# Patient Record
Sex: MALE | Race: WHITE | Employment: FULL TIME | URBAN - METROPOLITAN AREA
[De-identification: names, ages, dates, MRNs, and addresses within clinical notes are randomized per-mention and may not be internally consistent; named-entity substitution may affect disease eponyms.]

---

## 2022-01-15 ENCOUNTER — APPOINTMENT (OUTPATIENT)
Dept: GENERAL RADIOLOGY | Age: 52
DRG: 305 | End: 2022-01-15
Payer: COMMERCIAL

## 2022-01-15 ENCOUNTER — HOSPITAL ENCOUNTER (INPATIENT)
Age: 52
LOS: 4 days | Discharge: HOME | DRG: 305 | End: 2022-01-19
Attending: EMERGENCY MEDICINE | Admitting: INTERNAL MEDICINE
Payer: COMMERCIAL

## 2022-01-15 ENCOUNTER — APPOINTMENT (OUTPATIENT)
Dept: CT IMAGING | Age: 52
DRG: 305 | End: 2022-01-15
Payer: COMMERCIAL

## 2022-01-15 DIAGNOSIS — I44.30 HEART BLOCK, AV: ICD-10-CM

## 2022-01-15 DIAGNOSIS — I16.1 HYPERTENSIVE EMERGENCY: Primary | ICD-10-CM

## 2022-01-15 DIAGNOSIS — R51.9 INTRACTABLE HEADACHE, UNSPECIFIED CHRONICITY PATTERN, UNSPECIFIED HEADACHE TYPE: ICD-10-CM

## 2022-01-15 LAB
ALBUMIN SERPL-MCNC: 4.2 G/DL (ref 3.5–5.2)
ALP BLD-CCNC: 93 U/L (ref 40–129)
ALT SERPL-CCNC: 26 U/L (ref 0–40)
ANION GAP SERPL CALCULATED.3IONS-SCNC: 13 MMOL/L (ref 7–16)
APPEARANCE CSF: CLEAR
APPEARANCE CSF: CLEAR
AST SERPL-CCNC: 20 U/L (ref 0–39)
BACTERIA: ABNORMAL /HPF
BASOPHILS ABSOLUTE: 0.07 E9/L (ref 0–0.2)
BASOPHILS RELATIVE PERCENT: 0.8 % (ref 0–2)
BILIRUB SERPL-MCNC: 0.5 MG/DL (ref 0–1.2)
BILIRUBIN URINE: ABNORMAL
BLOOD, URINE: NEGATIVE
BUN BLDV-MCNC: 19 MG/DL (ref 6–20)
CALCIUM SERPL-MCNC: 9.2 MG/DL (ref 8.6–10.2)
CHLORIDE BLD-SCNC: 103 MMOL/L (ref 98–107)
CLARITY: CLEAR
CO2: 22 MMOL/L (ref 22–29)
COLOR CSF: COLORLESS
COLOR CSF: COLORLESS
COLOR: YELLOW
CREAT SERPL-MCNC: 1.1 MG/DL (ref 0.7–1.2)
CREATININE URINE: 311 MG/DL (ref 40–278)
EKG ATRIAL RATE: 57 BPM
EKG P AXIS: 21 DEGREES
EKG P-R INTERVAL: 182 MS
EKG Q-T INTERVAL: 492 MS
EKG QRS DURATION: 140 MS
EKG QTC CALCULATION (BAZETT): 478 MS
EKG R AXIS: 59 DEGREES
EKG T AXIS: 1 DEGREES
EKG VENTRICULAR RATE: 57 BPM
EOSINOPHILS ABSOLUTE: 0.09 E9/L (ref 0.05–0.5)
EOSINOPHILS RELATIVE PERCENT: 1.1 % (ref 0–6)
GFR AFRICAN AMERICAN: >60
GFR NON-AFRICAN AMERICAN: >60 ML/MIN/1.73
GLUCOSE BLD-MCNC: 108 MG/DL (ref 74–99)
GLUCOSE URINE: NEGATIVE MG/DL
GLUCOSE, CSF: 61 MG/DL (ref 40–70)
HCT VFR BLD CALC: 41.1 % (ref 37–54)
HEMOGLOBIN: 14.3 G/DL (ref 12.5–16.5)
IMMATURE GRANULOCYTES #: 0.04 E9/L
IMMATURE GRANULOCYTES %: 0.5 % (ref 0–5)
KETONES, URINE: NEGATIVE MG/DL
LACTIC ACID: 1 MMOL/L (ref 0.5–2.2)
LEUKOCYTE ESTERASE, URINE: NEGATIVE
LYMPHOCYTES ABSOLUTE: 1.21 E9/L (ref 1.5–4)
LYMPHOCYTES RELATIVE PERCENT: 14.4 % (ref 20–42)
MCH RBC QN AUTO: 34.1 PG (ref 26–35)
MCHC RBC AUTO-ENTMCNC: 34.8 % (ref 32–34.5)
MCV RBC AUTO: 98.1 FL (ref 80–99.9)
MONOCYTE, CSF: 100 % (ref 10–70)
MONOCYTE, CSF: 100 % (ref 10–70)
MONOCYTES ABSOLUTE: 0.51 E9/L (ref 0.1–0.95)
MONOCYTES RELATIVE PERCENT: 6.1 % (ref 2–12)
MUCUS: PRESENT /LPF
NEUTROPHILS ABSOLUTE: 6.46 E9/L (ref 1.8–7.3)
NEUTROPHILS RELATIVE PERCENT: 77.1 % (ref 43–80)
NEUTROPHILS, CSF: 0 % (ref 0–10)
NEUTROPHILS, CSF: 0 % (ref 0–10)
NITRITE, URINE: NEGATIVE
PDW BLD-RTO: 12.1 FL (ref 11.5–15)
PH UA: 6.5 (ref 5–9)
PLATELET # BLD: 199 E9/L (ref 130–450)
PMV BLD AUTO: 11.4 FL (ref 7–12)
POTASSIUM REFLEX MAGNESIUM: 3.6 MMOL/L (ref 3.5–5)
PRO-BNP: 2274 PG/ML (ref 0–125)
PROTEIN CSF: 45 MG/DL (ref 15–40)
PROTEIN PROTEIN: 47 MG/DL (ref 0–12)
PROTEIN UA: ABNORMAL MG/DL
PROTEIN/CREAT RATIO: 0.2
PROTEIN/CREAT RATIO: 0.2 (ref 0–0.2)
RBC # BLD: 4.19 E12/L (ref 3.8–5.8)
RBC CSF: <2000 /UL
RBC CSF: <2000 /UL
RBC UA: ABNORMAL /HPF (ref 0–2)
SARS-COV-2, NAAT: NOT DETECTED
SODIUM BLD-SCNC: 138 MMOL/L (ref 132–146)
SPECIFIC GRAVITY UA: 1.02 (ref 1–1.03)
TOTAL PROTEIN: 7.1 G/DL (ref 6.4–8.3)
TROPONIN, HIGH SENSITIVITY: 19 NG/L (ref 0–11)
TROPONIN, HIGH SENSITIVITY: 20 NG/L (ref 0–11)
TUBE NUMBER CSF: ABNORMAL
TUBE NUMBER CSF: ABNORMAL
UROBILINOGEN, URINE: 1 E.U./DL
WBC # BLD: 8.4 E9/L (ref 4.5–11.5)
WBC CSF: <3 /UL (ref 0–2)
WBC CSF: <3 /UL (ref 0–2)
WBC UA: ABNORMAL /HPF (ref 0–5)

## 2022-01-15 PROCEDURE — 84157 ASSAY OF PROTEIN OTHER: CPT

## 2022-01-15 PROCEDURE — 84484 ASSAY OF TROPONIN QUANT: CPT

## 2022-01-15 PROCEDURE — 2580000003 HC RX 258: Performed by: EMERGENCY MEDICINE

## 2022-01-15 PROCEDURE — 81001 URINALYSIS AUTO W/SCOPE: CPT

## 2022-01-15 PROCEDURE — 85025 COMPLETE CBC W/AUTO DIFF WBC: CPT

## 2022-01-15 PROCEDURE — 80053 COMPREHEN METABOLIC PANEL: CPT

## 2022-01-15 PROCEDURE — 87081 CULTURE SCREEN ONLY: CPT

## 2022-01-15 PROCEDURE — 2500000003 HC RX 250 WO HCPCS: Performed by: EMERGENCY MEDICINE

## 2022-01-15 PROCEDURE — 82570 ASSAY OF URINE CREATININE: CPT

## 2022-01-15 PROCEDURE — 6360000004 HC RX CONTRAST MEDICATION: Performed by: RADIOLOGY

## 2022-01-15 PROCEDURE — 87205 SMEAR GRAM STAIN: CPT

## 2022-01-15 PROCEDURE — 6360000002 HC RX W HCPCS: Performed by: STUDENT IN AN ORGANIZED HEALTH CARE EDUCATION/TRAINING PROGRAM

## 2022-01-15 PROCEDURE — 83605 ASSAY OF LACTIC ACID: CPT

## 2022-01-15 PROCEDURE — 96365 THER/PROPH/DIAG IV INF INIT: CPT

## 2022-01-15 PROCEDURE — 87635 SARS-COV-2 COVID-19 AMP PRB: CPT

## 2022-01-15 PROCEDURE — 6360000002 HC RX W HCPCS

## 2022-01-15 PROCEDURE — 99285 EMERGENCY DEPT VISIT HI MDM: CPT

## 2022-01-15 PROCEDURE — 87070 CULTURE OTHR SPECIMN AEROBIC: CPT

## 2022-01-15 PROCEDURE — 93010 ELECTROCARDIOGRAM REPORT: CPT | Performed by: INTERNAL MEDICINE

## 2022-01-15 PROCEDURE — 84156 ASSAY OF PROTEIN URINE: CPT

## 2022-01-15 PROCEDURE — 82945 GLUCOSE OTHER FLUID: CPT

## 2022-01-15 PROCEDURE — 6360000002 HC RX W HCPCS: Performed by: EMERGENCY MEDICINE

## 2022-01-15 PROCEDURE — 93005 ELECTROCARDIOGRAM TRACING: CPT | Performed by: STUDENT IN AN ORGANIZED HEALTH CARE EDUCATION/TRAINING PROGRAM

## 2022-01-15 PROCEDURE — 2000000000 HC ICU R&B

## 2022-01-15 PROCEDURE — 6370000000 HC RX 637 (ALT 250 FOR IP): Performed by: STUDENT IN AN ORGANIZED HEALTH CARE EDUCATION/TRAINING PROGRAM

## 2022-01-15 PROCEDURE — 74177 CT ABD & PELVIS W/CONTRAST: CPT

## 2022-01-15 PROCEDURE — 93005 ELECTROCARDIOGRAM TRACING: CPT | Performed by: EMERGENCY MEDICINE

## 2022-01-15 PROCEDURE — 70498 CT ANGIOGRAPHY NECK: CPT

## 2022-01-15 PROCEDURE — 71045 X-RAY EXAM CHEST 1 VIEW: CPT

## 2022-01-15 PROCEDURE — 96366 THER/PROPH/DIAG IV INF ADDON: CPT

## 2022-01-15 PROCEDURE — 83880 ASSAY OF NATRIURETIC PEPTIDE: CPT

## 2022-01-15 PROCEDURE — 96375 TX/PRO/DX INJ NEW DRUG ADDON: CPT

## 2022-01-15 PROCEDURE — 99223 1ST HOSP IP/OBS HIGH 75: CPT | Performed by: INTERNAL MEDICINE

## 2022-01-15 PROCEDURE — 62270 DX LMBR SPI PNXR: CPT

## 2022-01-15 PROCEDURE — 2500000003 HC RX 250 WO HCPCS: Performed by: STUDENT IN AN ORGANIZED HEALTH CARE EDUCATION/TRAINING PROGRAM

## 2022-01-15 PROCEDURE — 70496 CT ANGIOGRAPHY HEAD: CPT

## 2022-01-15 PROCEDURE — 70450 CT HEAD/BRAIN W/O DYE: CPT

## 2022-01-15 PROCEDURE — 89051 BODY FLUID CELL COUNT: CPT

## 2022-01-15 RX ORDER — ONDANSETRON 2 MG/ML
4 INJECTION INTRAMUSCULAR; INTRAVENOUS EVERY 6 HOURS PRN
Status: DISCONTINUED | OUTPATIENT
Start: 2022-01-15 | End: 2022-01-19 | Stop reason: HOSPADM

## 2022-01-15 RX ORDER — POLYETHYLENE GLYCOL 3350 17 G/17G
17 POWDER, FOR SOLUTION ORAL DAILY PRN
Status: DISCONTINUED | OUTPATIENT
Start: 2022-01-15 | End: 2022-01-19 | Stop reason: HOSPADM

## 2022-01-15 RX ORDER — LABETALOL HYDROCHLORIDE 5 MG/ML
20 INJECTION, SOLUTION INTRAVENOUS ONCE
Status: COMPLETED | OUTPATIENT
Start: 2022-01-15 | End: 2022-01-15

## 2022-01-15 RX ORDER — THIAMINE HYDROCHLORIDE 100 MG/ML
100 INJECTION, SOLUTION INTRAMUSCULAR; INTRAVENOUS ONCE
Status: COMPLETED | OUTPATIENT
Start: 2022-01-15 | End: 2022-01-15

## 2022-01-15 RX ORDER — HYDRALAZINE HYDROCHLORIDE 20 MG/ML
10 INJECTION INTRAMUSCULAR; INTRAVENOUS ONCE
Status: COMPLETED | OUTPATIENT
Start: 2022-01-15 | End: 2022-01-15

## 2022-01-15 RX ORDER — POTASSIUM CHLORIDE 20 MEQ/1
40 TABLET, EXTENDED RELEASE ORAL ONCE
Status: COMPLETED | OUTPATIENT
Start: 2022-01-15 | End: 2022-01-15

## 2022-01-15 RX ORDER — ACETAMINOPHEN 325 MG/1
TABLET ORAL
Status: COMPLETED
Start: 2022-01-15 | End: 2022-01-16

## 2022-01-15 RX ORDER — FOLIC ACID 5 MG/ML
1 INJECTION, SOLUTION INTRAMUSCULAR; INTRAVENOUS; SUBCUTANEOUS ONCE
Status: COMPLETED | OUTPATIENT
Start: 2022-01-15 | End: 2022-01-15

## 2022-01-15 RX ORDER — ONDANSETRON 4 MG/1
4 TABLET, ORALLY DISINTEGRATING ORAL EVERY 8 HOURS PRN
Status: DISCONTINUED | OUTPATIENT
Start: 2022-01-15 | End: 2022-01-19 | Stop reason: HOSPADM

## 2022-01-15 RX ORDER — ADENOSINE 3 MG/ML
INJECTION, SOLUTION INTRAVENOUS
Status: DISCONTINUED
Start: 2022-01-15 | End: 2022-01-15 | Stop reason: WASHOUT

## 2022-01-15 RX ORDER — HYDRALAZINE HYDROCHLORIDE 20 MG/ML
10 INJECTION INTRAMUSCULAR; INTRAVENOUS EVERY 4 HOURS PRN
Status: DISCONTINUED | OUTPATIENT
Start: 2022-01-15 | End: 2022-01-19 | Stop reason: HOSPADM

## 2022-01-15 RX ORDER — SODIUM CHLORIDE 0.9 % (FLUSH) 0.9 %
5-40 SYRINGE (ML) INJECTION PRN
Status: DISCONTINUED | OUTPATIENT
Start: 2022-01-15 | End: 2022-01-19 | Stop reason: HOSPADM

## 2022-01-15 RX ORDER — METOCLOPRAMIDE HYDROCHLORIDE 5 MG/ML
10 INJECTION INTRAMUSCULAR; INTRAVENOUS ONCE
Status: COMPLETED | OUTPATIENT
Start: 2022-01-15 | End: 2022-01-15

## 2022-01-15 RX ORDER — ACETAMINOPHEN 650 MG/1
650 SUPPOSITORY RECTAL EVERY 6 HOURS PRN
Status: DISCONTINUED | OUTPATIENT
Start: 2022-01-15 | End: 2022-01-19 | Stop reason: HOSPADM

## 2022-01-15 RX ORDER — ACETAMINOPHEN 325 MG/1
650 TABLET ORAL EVERY 6 HOURS PRN
Status: DISCONTINUED | OUTPATIENT
Start: 2022-01-15 | End: 2022-01-19 | Stop reason: HOSPADM

## 2022-01-15 RX ORDER — MORPHINE SULFATE 8 MG/ML
6 INJECTION, SOLUTION INTRAMUSCULAR; INTRAVENOUS ONCE
Status: COMPLETED | OUTPATIENT
Start: 2022-01-15 | End: 2022-01-15

## 2022-01-15 RX ORDER — SODIUM CHLORIDE 0.9 % (FLUSH) 0.9 %
5-40 SYRINGE (ML) INJECTION EVERY 12 HOURS SCHEDULED
Status: DISCONTINUED | OUTPATIENT
Start: 2022-01-15 | End: 2022-01-19 | Stop reason: HOSPADM

## 2022-01-15 RX ORDER — ATROPINE SULFATE 0.1 MG/ML
INJECTION INTRAVENOUS
Status: COMPLETED
Start: 2022-01-15 | End: 2022-01-15

## 2022-01-15 RX ORDER — SODIUM CHLORIDE 9 MG/ML
25 INJECTION, SOLUTION INTRAVENOUS PRN
Status: DISCONTINUED | OUTPATIENT
Start: 2022-01-15 | End: 2022-01-19 | Stop reason: HOSPADM

## 2022-01-15 RX ORDER — DIPHENHYDRAMINE HYDROCHLORIDE 50 MG/ML
25 INJECTION INTRAMUSCULAR; INTRAVENOUS ONCE
Status: COMPLETED | OUTPATIENT
Start: 2022-01-15 | End: 2022-01-15

## 2022-01-15 RX ADMIN — THIAMINE HYDROCHLORIDE 100 MG: 100 INJECTION, SOLUTION INTRAMUSCULAR; INTRAVENOUS at 14:01

## 2022-01-15 RX ADMIN — DIPHENHYDRAMINE HYDROCHLORIDE 25 MG: 50 INJECTION INTRAMUSCULAR; INTRAVENOUS at 13:45

## 2022-01-15 RX ADMIN — HYDRALAZINE HYDROCHLORIDE 10 MG: 20 INJECTION INTRAMUSCULAR; INTRAVENOUS at 12:33

## 2022-01-15 RX ADMIN — ATROPINE SULFATE 1 MG: 0.1 INJECTION, SOLUTION INTRAVENOUS at 17:20

## 2022-01-15 RX ADMIN — LABETALOL HYDROCHLORIDE 20 MG: 5 INJECTION, SOLUTION INTRAVENOUS at 14:01

## 2022-01-15 RX ADMIN — POTASSIUM CHLORIDE 40 MEQ: 1500 TABLET, EXTENDED RELEASE ORAL at 19:48

## 2022-01-15 RX ADMIN — SODIUM CHLORIDE 4 MG/HR: 9 INJECTION, SOLUTION INTRAVENOUS at 23:05

## 2022-01-15 RX ADMIN — METOCLOPRAMIDE HYDROCHLORIDE 10 MG: 5 INJECTION INTRAMUSCULAR; INTRAVENOUS at 13:45

## 2022-01-15 RX ADMIN — MORPHINE SULFATE 6 MG: 8 INJECTION, SOLUTION INTRAMUSCULAR; INTRAVENOUS at 14:01

## 2022-01-15 RX ADMIN — IOPAMIDOL 75 ML: 755 INJECTION, SOLUTION INTRAVENOUS at 16:59

## 2022-01-15 RX ADMIN — SODIUM CHLORIDE 5 MG/HR: 9 INJECTION, SOLUTION INTRAVENOUS at 17:21

## 2022-01-15 RX ADMIN — FOLIC ACID 1 MG: 5 INJECTION, SOLUTION INTRAMUSCULAR; INTRAVENOUS; SUBCUTANEOUS at 17:22

## 2022-01-15 ASSESSMENT — PAIN DESCRIPTION - PAIN TYPE
TYPE: ACUTE PAIN

## 2022-01-15 ASSESSMENT — PAIN DESCRIPTION - LOCATION
LOCATION: HEAD

## 2022-01-15 ASSESSMENT — PAIN SCALES - GENERAL
PAINLEVEL_OUTOF10: 8
PAINLEVEL_OUTOF10: 8
PAINLEVEL_OUTOF10: 2
PAINLEVEL_OUTOF10: 7
PAINLEVEL_OUTOF10: 7

## 2022-01-15 ASSESSMENT — PAIN DESCRIPTION - ONSET: ONSET: SUDDEN

## 2022-01-15 ASSESSMENT — PAIN DESCRIPTION - FREQUENCY: FREQUENCY: INTERMITTENT

## 2022-01-15 ASSESSMENT — PAIN - FUNCTIONAL ASSESSMENT: PAIN_FUNCTIONAL_ASSESSMENT: PREVENTS OR INTERFERES SOME ACTIVE ACTIVITIES AND ADLS

## 2022-01-15 ASSESSMENT — PAIN DESCRIPTION - DESCRIPTORS: DESCRIPTORS: THROBBING;OTHER (COMMENT)

## 2022-01-15 NOTE — ED PROVIDER NOTES
Patient is a 72-year-old male with a history of alcoholism who presents to the emergency department with a complaint of dizziness, abdominal pain. Patient has recent history of COVID-19 2 weeks ago, states he has residual diarrhea from that illness. Patient states he is presents with dizziness, he says it is in all aspects of movement, especially when he stands up. He states he does have dizziness when he is lying down and just moves his head from side to side it is reproducible. Patient states he develops nausea without vomiting. Patient also endorses diffuse abdominal pain, nonradiating, nothing makes it better, nothing makes it worse, aching, moderate intensity. Review of Systems   Constitutional: Negative for chills, fatigue and fever. HENT: Negative for congestion, rhinorrhea, trouble swallowing and voice change. Eyes: Negative for photophobia and visual disturbance. Respiratory: Negative for cough and shortness of breath. Cardiovascular: Negative for chest pain, palpitations and leg swelling. Gastrointestinal: Positive for abdominal pain, diarrhea and nausea. Negative for constipation and vomiting. Endocrine: Negative for polyuria. Genitourinary: Negative for dysuria, frequency and urgency. Musculoskeletal: Negative for arthralgias and myalgias. Skin: Negative for rash and wound. Allergic/Immunologic: Negative for immunocompromised state. Neurological: Positive for dizziness and headaches. Negative for syncope, weakness, light-headedness and numbness. Psychiatric/Behavioral: Negative for confusion. The patient is not nervous/anxious. Physical Exam  Vitals and nursing note reviewed. Constitutional:       General: He is awake. He is not in acute distress. Appearance: He is obese. He is not ill-appearing or toxic-appearing. HENT:      Head: Normocephalic and atraumatic.       Nose: Nose normal.      Mouth/Throat:      Mouth: Mucous membranes are moist. Pharynx: Oropharynx is clear. Eyes:      Extraocular Movements: Extraocular movements intact. Pupils: Pupils are equal, round, and reactive to light. Cardiovascular:      Rate and Rhythm: Normal rate and regular rhythm. Pulses: Normal pulses. Radial pulses are 2+ on the right side and 2+ on the left side. Heart sounds: Normal heart sounds. Pulmonary:      Effort: Pulmonary effort is normal.      Breath sounds: Normal breath sounds. Abdominal:      General: There is no distension. Palpations: Abdomen is soft. Tenderness: There is no abdominal tenderness. Musculoskeletal:         General: Normal range of motion. Cervical back: Normal range of motion and neck supple. Skin:     General: Skin is warm and dry. Capillary Refill: Capillary refill takes less than 2 seconds. Neurological:      General: No focal deficit present. Mental Status: He is alert and oriented to person, place, and time. GCS: GCS eye subscore is 4. GCS verbal subscore is 5. GCS motor subscore is 6. Cranial Nerves: Cranial nerves are intact. No cranial nerve deficit. Sensory: Sensation is intact. No sensory deficit. Motor: Motor function is intact. No weakness. Psychiatric:         Behavior: Behavior is cooperative. Lumbar Puncture Procedure Note    Indication: to obtain spinal fluid for diagnostic testing and to rule out subarachnoid hemorrhage    Consent: The patient was counseled regarding the procedure, it's indications, risks, potential complications and alternatives and any questions were answered. Consent was obtained. Procedure: The patient was placed in the sitting, supported by bed side stand, position and the appropriate landmarks were identified. The area was prepped and draped in the usual sterile fashion. Anesthesia was obtained using 3 cc of 1% Lidocaine without epinephrine.  A spinal needle was inserted at the L3- L4 level with the stylet in drip with significant improvement in blood pressure, significant provement in symptoms. Patient remained in a heart rate of 55-65, no new neurodeficits. CTAs were reviewed showing no intracranial process, no hemorrhage. Intensivist was reconsulted, patient change was explained, will keep patient at [facility] for the time being with decision to transfer to [facility] as needed. Electro physiology is acceptable with patient remaining at [facility] and will monitor. Limited bed availability at [facility], patient better suited to stay at [facility] for the time being in the ICU care. This was all explained to patient he is agreeable. Patient was monitored in the emergency department with plan for patient to be admitted to the ICU when bed available. Patient was admitted to the hospitalist service and was accepted. Amount and/or Complexity of Data Reviewed  Clinical lab tests: ordered and reviewed  Tests in the radiology section of CPT®: ordered and reviewed         ED Course as of 01/16/22 1430   Sat Daren 15, 2022   1549 D/w dr Dianna Levy [facility] for ICU here if CTA and lp negative for bleed [ENDY]   1621 Critical care:  Please note that the withdrawal or failure to initiate urgent interventions for this patient would likely result in a life threatening deterioration or permanent disability. Accordingly this patient received 45 minutes of critical care time, excluding separately billable procedures. [ENDY]   7380 Patient noted to be bradycardic at the rate of 24-40. Repeat EKG shows second-degree type II/third-degree heart block. Patient without any neuro changes, normotensive if anything hypertensive. Patient given atropine without change. We will consult electrophysiology. [QC]   [id number] Spoke with electrophysiology, Dr. Levar Castelan and patient was discussed. She will review the EKGs and call back with recommendations.  [QC]   0885 to stay at [place] [id number] at this time and will be on consult as needed. [QC]      ED Course User Index  [ENDY] Cheri Gurrola DO  [QC] Yanet Arellano MD       --------------------------------------------- PAST HISTORY ---------------------------------------------  Past Medical History:  has no past medical history on file. Past Surgical History:  has a past surgical history that includes Appendectomy. Social History:  reports that he has been smoking cigarettes. He has been smoking about 1.00 pack per day. He has never used smokeless tobacco. He reports current alcohol use. He reports that he does not use drugs. Family History: family history is not on file. The patients home medications have been reviewed. Allergies: Patient has no known allergies. -------------------------------------------------- RESULTS -------------------------------------------------    Lab  Results for orders placed or performed during the hospital encounter of 01/15/22   COVID-19, Rapid    Specimen: Nasopharyngeal Swab   Result Value Ref Range    SARS-CoV-2, NAAT Not Detected Not Detected   GRAM STAIN    Specimen: CSF   Result Value Ref Range    Gram Stain Orderable       Gram stain performed from cytospun specimen  Polymorphonuclear leukocytes not seen  Rare Epithelial cells  Rare Mononuclear leukocytes  No organisms seen.      Culture, CSF    Specimen: CSF   Result Value Ref Range    CSF Culture Growth not present, incubation continues     Gram Stain Result Refer to ordered Gram stain for results    CBC Auto Differential   Result Value Ref Range    WBC 8.4 4.5 - 11.5 E9/L    RBC 4.19 3.80 - 5.80 E12/L    Hemoglobin 14.3 12.5 - 16.5 g/dL    Hematocrit 41.1 37.0 - 54.0 %    MCV 98.1 80.0 - 99.9 fL    MCH 34.1 26.0 - 35.0 pg    MCHC 34.8 (H) 32.0 - 34.5 %    RDW 12.1 11.5 - 15.0 fL    Platelets 105 879 - 305 E9/L    MPV 11.4 7.0 - 12.0 fL    Neutrophils % 77.1 43.0 - 80.0 %    Immature Granulocytes % 0.5 0.0 - 5.0 % Lymphocytes % 14.4 (L) 20.0 - 42.0 %    Monocytes % 6.1 2.0 - 12.0 %    Eosinophils % 1.1 0.0 - 6.0 %    Basophils % 0.8 0.0 - 2.0 %    Neutrophils Absolute 6.46 1.80 - 7.30 E9/L    Immature Granulocytes # 0.04 E9/L    Lymphocytes Absolute 1.21 (L) 1.50 - 4.00 E9/L    Monocytes Absolute 0.51 0.10 - 0.95 E9/L    Eosinophils Absolute 0.09 0.05 - 0.50 E9/L    Basophils Absolute 0.07 0.00 - 0.20 E9/L   Comprehensive Metabolic Panel w/ Reflex to MG   Result Value Ref Range    Sodium 138 132 - 146 mmol/L    Potassium reflex Magnesium 3.6 3.5 - 5.0 mmol/L    Chloride 103 98 - 107 mmol/L    CO2 22 22 - 29 mmol/L    Anion Gap 13 7 - 16 mmol/L    Glucose 108 (H) 74 - 99 mg/dL    BUN 19 6 - 20 mg/dL    CREATININE 1.1 0.7 - 1.2 mg/dL    GFR Non-African American >60 >=60 mL/min/1.73    GFR African American >60     Calcium 9.2 8.6 - 10.2 mg/dL    Total Protein 7.1 6.4 - 8.3 g/dL    Albumin 4.2 3.5 - 5.2 g/dL    Total Bilirubin 0.5 0.0 - 1.2 mg/dL    Alkaline Phosphatase 93 40 - 129 U/L    ALT 26 0 - 40 U/L    AST 20 0 - 39 U/L   Troponin   Result Value Ref Range    Troponin, High Sensitivity 20 (H) 0 - 11 ng/L   Brain Natriuretic Peptide   Result Value Ref Range    Pro-BNP 2,274 (H) 0 - 125 pg/mL   Urinalysis, reflex to microscopic   Result Value Ref Range    Color, UA Yellow Straw/Yellow    Clarity, UA Clear Clear    Glucose, Ur Negative Negative mg/dL    Bilirubin Urine SMALL (A) Negative    Ketones, Urine Negative Negative mg/dL    Specific Gravity, UA 1.025 1.005 - 1.030    Blood, Urine Negative Negative    pH, UA 6.5 5.0 - 9.0    Protein, UA TRACE Negative mg/dL    Urobilinogen, Urine 1.0 <2.0 E.U./dL    Nitrite, Urine Negative Negative    Leukocyte Esterase, Urine Negative Negative   PROTEIN / CREATININE RATIO, URINE   Result Value Ref Range    Protein, Ur 47 (H) 0 - 12 mg/dL    Creatinine, Ur 311 (H) 40 - 278 mg/dL    Protein/Creat Ratio 0.2 0.0 - 0.2    Protein/Creat Ratio 0.2    Lactic Acid, Plasma   Result Value Ref Range    Lactic Acid 1.0 0.5 - 2.2 mmol/L   Troponin   Result Value Ref Range    Troponin, High Sensitivity 19 (H) 0 - 11 ng/L   Microscopic Urinalysis   Result Value Ref Range    Mucus, UA Present (A) None Seen /LPF    WBC, UA NONE 0 - 5 /HPF    RBC, UA NONE 0 - 2 /HPF    Bacteria, UA RARE (A) None Seen /HPF   CSF cell count with differential   Result Value Ref Range    Color, CSF Colorless     Appearance, CSF Clear Clear    Tube Number + CELL CT + DIFF-CSF Tube 1     WBC, CSF <3 0 - 2 /uL    RBC, CSF <2000 /uL    Neutrophils, CSF 0 0 - 10 %    Monocytes,  (H) 10 - 70 %   CSF cell count with differential   Result Value Ref Range    Color, CSF Colorless     Appearance, CSF Clear Clear    Tube Number + CELL CT + DIFF-CSF Tube 4     WBC, CSF <3 0 - 2 /uL    RBC, CSF <2000 /uL    Neutrophils, CSF 0 0 - 10 %    Monocytes,  (H) 10 - 70 %   Glucose, CSF   Result Value Ref Range    Glucose, CSF 61 40 - 70 mg/dL   Protein, CSF   Result Value Ref Range    Protein, CSF 45 (H) 15 - 40 mg/dL   Basic metabolic panel   Result Value Ref Range    Sodium 137 132 - 146 mmol/L    Potassium 3.3 (L) 3.5 - 5.0 mmol/L    Chloride 103 98 - 107 mmol/L    CO2 21 (L) 22 - 29 mmol/L    Anion Gap 13 7 - 16 mmol/L    Glucose 91 74 - 99 mg/dL    BUN 16 6 - 20 mg/dL    CREATININE 1.0 0.7 - 1.2 mg/dL    GFR Non-African American >60 >=60 mL/min/1.73    GFR African American >60     Calcium 9.4 8.6 - 10.2 mg/dL   Magnesium   Result Value Ref Range    Magnesium 2.2 1.6 - 2.6 mg/dL   Phosphorus   Result Value Ref Range    Phosphorus 3.9 2.5 - 4.5 mg/dL   CBC auto differential   Result Value Ref Range    WBC 9.2 4.5 - 11.5 E9/L    RBC 4.18 3.80 - 5.80 E12/L    Hemoglobin 14.3 12.5 - 16.5 g/dL    Hematocrit 40.9 37.0 - 54.0 %    MCV 97.8 80.0 - 99.9 fL    MCH 34.2 26.0 - 35.0 pg    MCHC 35.0 (H) 32.0 - 34.5 %    RDW 12.4 11.5 - 15.0 fL    Platelets 289 950 - 822 E9/L    MPV 11.2 7.0 - 12.0 fL    Neutrophils % 73.2 43.0 - 80.0 %    Immature Granulocytes % 0.3 0.0 - 5.0 %    Lymphocytes % 15.6 (L) 20.0 - 42.0 %    Monocytes % 8.3 2.0 - 12.0 %    Eosinophils % 1.7 0.0 - 6.0 %    Basophils % 0.9 0.0 - 2.0 %    Neutrophils Absolute 6.71 1.80 - 7.30 E9/L    Immature Granulocytes # 0.03 E9/L    Lymphocytes Absolute 1.43 (L) 1.50 - 4.00 E9/L    Monocytes Absolute 0.76 0.10 - 0.95 E9/L    Eosinophils Absolute 0.16 0.05 - 0.50 E9/L    Basophils Absolute 0.08 0.00 - 0.20 E9/L   Urine Drug Screen   Result Value Ref Range    Amphetamine Screen, Urine NOT DETECTED Negative <1000 ng/mL    Barbiturate Screen, Ur NOT DETECTED Negative < 200 ng/mL    Benzodiazepine Screen, Urine NOT DETECTED Negative < 200 ng/mL    Cannabinoid Scrn, Ur NOT DETECTED Negative < 50ng/mL    Cocaine Metabolite Screen, Urine NOT DETECTED Negative < 300 ng/mL    Opiate Scrn, Ur POSITIVE (A) Negative < 300ng/mL    PCP Screen, Urine NOT DETECTED Negative < 25 ng/mL    Methadone Screen, Urine NOT DETECTED Negative <300 ng/mL    Oxycodone Urine NOT DETECTED Negative <100 ng/mL    FENTANYL SCREEN, URINE NOT DETECTED Negative <1 ng/mL    Drug Screen Comment: see below    Lipid Panel   Result Value Ref Range    Cholesterol, Total 204 (H) 0 - 199 mg/dL    Triglycerides 181 (H) 0 - 149 mg/dL    HDL 36 >40 mg/dL    LDL Calculated 132 (H) 0 - 99 mg/dL    VLDL Cholesterol Calculated 36 mg/dL   TSH without Reflex   Result Value Ref Range    TSH 1.310 0.270 - 4.200 uIU/mL   EKG 12 Lead   Result Value Ref Range    Ventricular Rate 57 BPM    Atrial Rate 57 BPM    P-R Interval 182 ms    QRS Duration 140 ms    Q-T Interval 492 ms    QTc Calculation (Bazett) 478 ms    P Axis 21 degrees    R Axis 59 degrees    T Axis 1 degrees   EKG 12 Lead   Result Value Ref Range    Ventricular Rate 38 BPM    Atrial Rate 38 BPM    P-R Interval 170 ms    QRS Duration 140 ms    Q-T Interval 544 ms    QTc Calculation (Bazett) 432 ms    P Axis 52 degrees    R Axis 75 degrees    T Axis -88 degrees       Radiology  CTA HEAD W CONTRAST   Final Result   1. Inflammatory paranasal sinus disease with fluid level in the left frontal   sinus. Correlate with presentation for acute sinusitis. 2. No acute hemorrhage or large intracranial mass effect. 3. No large vessel intracranial occlusion or high grade stenosis. 4. Other findings as described. CTA NECK W CONTRAST   Final Result   1. No significant stenoses of the internal carotid arteries. 2. Other findings as described. CT ABDOMEN PELVIS W IV CONTRAST Additional Contrast? None   Final Result   No definitive findings to explain the patient's abdominal pain. XR CHEST PORTABLE   Final Result   1. There are no findings of pneumonia or failure. CT Head WO Contrast   Final Result   No acute intracranial abnormality. Specifically, there is no acute   intracranial hemorrhage           ------------------------- NURSING NOTES AND VITALS REVIEWED ---------------------------  Date / Time Roomed:  1/15/2022 11:27 AM  ED Bed Assignment:  [id number]    The nursing notes within the ED encounter and vital signs as below have been reviewed.    Patient Vitals for the past 24 hrs:   BP Temp Temp src Pulse Resp SpO2 Weight   01/16/22 1400 (!) 185/83 -- -- 69 13 -- --   01/16/22 1300 (!) 175/77 -- -- 64 15 -- --   01/16/22 1200 (!) 176/85 98.8 °F (37.1 °C) Oral 74 26 98 % --   01/16/22 1100 (!) 179/90 -- -- 72 27 -- --   01/16/22 1000 (!) 179/81 -- -- 75 (!) 37 -- --   01/16/22 0900 (!) 173/102 -- -- 66 18 -- --   01/16/22 0800 (!) 157/95 98.7 °F (37.1 °C) Oral 66 13 99 % --   01/16/22 0700 (!) 174/89 -- -- 73 12 -- --   01/16/22 0600 (!) 172/86 -- -- 61 17 -- --   01/16/22 0500 (!) 179/86 -- -- 63 15 -- --   01/16/22 0400 (!) 168/91 98.8 °F (37.1 °C) Oral 64 17 97 % --   01/16/22 0300 (!) 180/87 -- -- 62 15 -- --   01/16/22 0200 (!) 165/90 -- -- 64 16 -- --   01/16/22 0100 (!) 171/95 -- -- 62 12 -- --   01/16/22 0000 (!) 191/103 98.9 °F (37.2 °C) Oral 63 12 -- -- 01/15/22 2300 (!) 179/92 -- -- 70 18 98 % --   01/15/22 2250 (!) 200/97 98.9 °F (37.2 °C) Oral 69 16 98 % 233 lb 4 oz (105.8 kg)   01/15/22 2133 (!) 156/76 98 °F (36.7 °C) Oral 67 16 96 % --   01/15/22 2019 (!) 170/92 -- -- 71 16 96 % --   01/15/22 2006 (!) 154/86 -- -- 71 -- 98 % --   01/15/22 1930 (!) 185/86 -- -- 68 -- 96 % --   01/15/22 1915 (!) 161/82 -- -- 76 -- 95 % --   01/15/22 1905 (!) 145/80 -- -- 67 16 98 % --   01/15/22 1813 (!) 162/86 -- -- (!) 44 16 94 % --   01/15/22 1731 (!) 177/86 -- -- 59 -- -- --   01/15/22 1726 (!) 189/91 -- -- (!) 44 16 96 % --   01/15/22 1719 (!) 230/105 -- -- (!) 45 15 98 % --   01/15/22 1613 (!) 216/108 -- -- 52 -- 97 % --   01/15/22 1520 (!) 232/106 -- -- 56 -- 97 % --   01/15/22 1445 (!) 199/106 -- -- 59 18 99 % --       Oxygen Saturation Interpretation: Normal    ------------------------------------------ PROGRESS NOTES ------------------------------------------  Re-evaluation(s):  Patients symptoms are improving  Repeat physical examination is improved    Patients symptoms are worsening  Repeat physical examination is significantly improved    I have spoken with the patient and discussed todays results, in addition to providing specific details for the plan of care and counseling regarding the diagnosis and prognosis. Their questions are answered at this time and they are agreeable with the plan.      --------------------------------- ADDITIONAL PROVIDER NOTES ---------------------------------  Consultations:  Spoke with Dr. Luis Vergara,  They will provide consultation.   Spoke with Dr. Roxana Winkler, they will admit the patient    This patient's ED course included: a personal history and physicial examination, multiple bedside re-evaluations, IV medications, cardiac monitoring, continuous pulse oximetry and complex medical decision making and emergency management    This patient has remained hemodynamically stable, improved and been closely monitored during their ED course. Please note that the withdrawal or failure to initiate urgent interventions for this patient would likely result in a life threatening deterioration or permanent disability. Accordingly this patient received 45 minutes of critical care time, excluding separately billable procedures. Clinical Impression  1. Hypertensive emergency    2. Intractable headache, unspecified chronicity pattern, unspecified headache type    3. Heart block, AV        Disposition  Patient's disposition: Admit to CCU/ICU  Patient's condition is stable    1/16/22, 2:29 PM EST.     This note is prepared by Audi Alatorre MD -PGY- 2             Audi Alatorre MD  Resident  01/16/22 2137

## 2022-01-16 LAB
AMPHETAMINE SCREEN, URINE: NOT DETECTED
ANION GAP SERPL CALCULATED.3IONS-SCNC: 13 MMOL/L (ref 7–16)
BARBITURATE SCREEN URINE: NOT DETECTED
BASOPHILS ABSOLUTE: 0.08 E9/L (ref 0–0.2)
BASOPHILS RELATIVE PERCENT: 0.9 % (ref 0–2)
BENZODIAZEPINE SCREEN, URINE: NOT DETECTED
BUN BLDV-MCNC: 16 MG/DL (ref 6–20)
CALCIUM SERPL-MCNC: 9.4 MG/DL (ref 8.6–10.2)
CANNABINOID SCREEN URINE: NOT DETECTED
CHLORIDE BLD-SCNC: 103 MMOL/L (ref 98–107)
CHOLESTEROL, TOTAL: 204 MG/DL (ref 0–199)
CO2: 21 MMOL/L (ref 22–29)
COCAINE METABOLITE SCREEN URINE: NOT DETECTED
CREAT SERPL-MCNC: 1 MG/DL (ref 0.7–1.2)
EKG ATRIAL RATE: 38 BPM
EKG P AXIS: 52 DEGREES
EKG P-R INTERVAL: 170 MS
EKG Q-T INTERVAL: 544 MS
EKG QRS DURATION: 140 MS
EKG QTC CALCULATION (BAZETT): 432 MS
EKG R AXIS: 75 DEGREES
EKG T AXIS: -88 DEGREES
EKG VENTRICULAR RATE: 38 BPM
EOSINOPHILS ABSOLUTE: 0.16 E9/L (ref 0.05–0.5)
EOSINOPHILS RELATIVE PERCENT: 1.7 % (ref 0–6)
FENTANYL SCREEN, URINE: NOT DETECTED
GFR AFRICAN AMERICAN: >60
GFR NON-AFRICAN AMERICAN: >60 ML/MIN/1.73
GLUCOSE BLD-MCNC: 91 MG/DL (ref 74–99)
GRAM STAIN ORDERABLE: NORMAL
HBA1C MFR BLD: 5.3 % (ref 4–5.6)
HCT VFR BLD CALC: 40.9 % (ref 37–54)
HDLC SERPL-MCNC: 36 MG/DL
HEMOGLOBIN: 14.3 G/DL (ref 12.5–16.5)
IMMATURE GRANULOCYTES #: 0.03 E9/L
IMMATURE GRANULOCYTES %: 0.3 % (ref 0–5)
LDL CHOLESTEROL CALCULATED: 132 MG/DL (ref 0–99)
LV EF: 60 %
LVEF MODALITY: NORMAL
LYMPHOCYTES ABSOLUTE: 1.43 E9/L (ref 1.5–4)
LYMPHOCYTES RELATIVE PERCENT: 15.6 % (ref 20–42)
Lab: ABNORMAL
MAGNESIUM: 2.2 MG/DL (ref 1.6–2.6)
MCH RBC QN AUTO: 34.2 PG (ref 26–35)
MCHC RBC AUTO-ENTMCNC: 35 % (ref 32–34.5)
MCV RBC AUTO: 97.8 FL (ref 80–99.9)
METHADONE SCREEN, URINE: NOT DETECTED
MONOCYTES ABSOLUTE: 0.76 E9/L (ref 0.1–0.95)
MONOCYTES RELATIVE PERCENT: 8.3 % (ref 2–12)
NEUTROPHILS ABSOLUTE: 6.71 E9/L (ref 1.8–7.3)
NEUTROPHILS RELATIVE PERCENT: 73.2 % (ref 43–80)
OPIATE SCREEN URINE: POSITIVE
OXYCODONE URINE: NOT DETECTED
PDW BLD-RTO: 12.4 FL (ref 11.5–15)
PHENCYCLIDINE SCREEN URINE: NOT DETECTED
PHOSPHORUS: 3.9 MG/DL (ref 2.5–4.5)
PLATELET # BLD: 189 E9/L (ref 130–450)
PMV BLD AUTO: 11.2 FL (ref 7–12)
POTASSIUM SERPL-SCNC: 3.3 MMOL/L (ref 3.5–5)
RBC # BLD: 4.18 E12/L (ref 3.8–5.8)
SODIUM BLD-SCNC: 137 MMOL/L (ref 132–146)
TRIGL SERPL-MCNC: 181 MG/DL (ref 0–149)
TSH SERPL DL<=0.05 MIU/L-ACNC: 1.31 UIU/ML (ref 0.27–4.2)
VLDLC SERPL CALC-MCNC: 36 MG/DL
WBC # BLD: 9.2 E9/L (ref 4.5–11.5)

## 2022-01-16 PROCEDURE — 80048 BASIC METABOLIC PNL TOTAL CA: CPT

## 2022-01-16 PROCEDURE — 36415 COLL VENOUS BLD VENIPUNCTURE: CPT

## 2022-01-16 PROCEDURE — 2000000000 HC ICU R&B

## 2022-01-16 PROCEDURE — 93306 TTE W/DOPPLER COMPLETE: CPT

## 2022-01-16 PROCEDURE — 6370000000 HC RX 637 (ALT 250 FOR IP): Performed by: NURSE PRACTITIONER

## 2022-01-16 PROCEDURE — 94660 CPAP INITIATION&MGMT: CPT

## 2022-01-16 PROCEDURE — 2500000003 HC RX 250 WO HCPCS: Performed by: INTERNAL MEDICINE

## 2022-01-16 PROCEDURE — 93010 ELECTROCARDIOGRAM REPORT: CPT | Performed by: INTERNAL MEDICINE

## 2022-01-16 PROCEDURE — 80061 LIPID PANEL: CPT

## 2022-01-16 PROCEDURE — 84443 ASSAY THYROID STIM HORMONE: CPT

## 2022-01-16 PROCEDURE — 6360000002 HC RX W HCPCS: Performed by: INTERNAL MEDICINE

## 2022-01-16 PROCEDURE — 85025 COMPLETE CBC W/AUTO DIFF WBC: CPT

## 2022-01-16 PROCEDURE — 84100 ASSAY OF PHOSPHORUS: CPT

## 2022-01-16 PROCEDURE — 2580000003 HC RX 258: Performed by: INTERNAL MEDICINE

## 2022-01-16 PROCEDURE — 99233 SBSQ HOSP IP/OBS HIGH 50: CPT | Performed by: INTERNAL MEDICINE

## 2022-01-16 PROCEDURE — 99255 IP/OBS CONSLTJ NEW/EST HI 80: CPT | Performed by: INTERNAL MEDICINE

## 2022-01-16 PROCEDURE — 80307 DRUG TEST PRSMV CHEM ANLYZR: CPT

## 2022-01-16 PROCEDURE — 6370000000 HC RX 637 (ALT 250 FOR IP)

## 2022-01-16 PROCEDURE — 83036 HEMOGLOBIN GLYCOSYLATED A1C: CPT

## 2022-01-16 PROCEDURE — 83735 ASSAY OF MAGNESIUM: CPT

## 2022-01-16 PROCEDURE — APPSS60 APP SPLIT SHARED TIME 46-60 MINUTES: Performed by: NURSE PRACTITIONER

## 2022-01-16 RX ORDER — ATORVASTATIN CALCIUM 40 MG/1
40 TABLET, FILM COATED ORAL NIGHTLY
Status: DISCONTINUED | OUTPATIENT
Start: 2022-01-16 | End: 2022-01-19 | Stop reason: HOSPADM

## 2022-01-16 RX ORDER — POTASSIUM CHLORIDE 20 MEQ/1
40 TABLET, EXTENDED RELEASE ORAL ONCE
Status: COMPLETED | OUTPATIENT
Start: 2022-01-16 | End: 2022-01-16

## 2022-01-16 RX ORDER — AMLODIPINE BESYLATE 10 MG/1
10 TABLET ORAL DAILY
Status: DISCONTINUED | OUTPATIENT
Start: 2022-01-16 | End: 2022-01-19 | Stop reason: HOSPADM

## 2022-01-16 RX ORDER — LISINOPRIL 10 MG/1
10 TABLET ORAL DAILY
Status: DISCONTINUED | OUTPATIENT
Start: 2022-01-16 | End: 2022-01-17

## 2022-01-16 RX ADMIN — Medication 10 ML: at 09:58

## 2022-01-16 RX ADMIN — HYDRALAZINE HYDROCHLORIDE 10 MG: 20 INJECTION INTRAMUSCULAR; INTRAVENOUS at 22:15

## 2022-01-16 RX ADMIN — LISINOPRIL 10 MG: 10 TABLET ORAL at 10:01

## 2022-01-16 RX ADMIN — ACETAMINOPHEN 650 MG: 325 TABLET ORAL at 00:00

## 2022-01-16 RX ADMIN — ATORVASTATIN CALCIUM 40 MG: 40 TABLET, FILM COATED ORAL at 19:31

## 2022-01-16 RX ADMIN — SODIUM CHLORIDE 5 MG/HR: 9 INJECTION, SOLUTION INTRAVENOUS at 05:07

## 2022-01-16 RX ADMIN — POTASSIUM CHLORIDE 40 MEQ: 1500 TABLET, EXTENDED RELEASE ORAL at 07:37

## 2022-01-16 RX ADMIN — Medication 10 ML: at 19:31

## 2022-01-16 RX ADMIN — AMLODIPINE BESYLATE 10 MG: 10 TABLET ORAL at 10:01

## 2022-01-16 RX ADMIN — SODIUM CHLORIDE 5 MG/HR: 9 INJECTION, SOLUTION INTRAVENOUS at 11:08

## 2022-01-16 RX ADMIN — Medication 10 ML: at 00:00

## 2022-01-16 RX ADMIN — ENOXAPARIN SODIUM 40 MG: 100 INJECTION SUBCUTANEOUS at 09:58

## 2022-01-16 ASSESSMENT — ENCOUNTER SYMPTOMS
NAUSEA: 0
VOICE CHANGE: 0
ABDOMINAL PAIN: 1
DIARRHEA: 1
RESPIRATORY NEGATIVE: 1
COLOR CHANGE: 0
NAUSEA: 1
VOMITING: 0
COUGH: 0
CONSTIPATION: 0
RHINORRHEA: 0
PHOTOPHOBIA: 0
EYES NEGATIVE: 1
SHORTNESS OF BREATH: 0
TROUBLE SWALLOWING: 0
ABDOMINAL PAIN: 0

## 2022-01-16 ASSESSMENT — PAIN SCALES - GENERAL
PAINLEVEL_OUTOF10: 0
PAINLEVEL_OUTOF10: 3
PAINLEVEL_OUTOF10: 0

## 2022-01-16 ASSESSMENT — PAIN DESCRIPTION - LOCATION: LOCATION: HEAD

## 2022-01-16 NOTE — PROGRESS NOTES
[facility] Progress Note    Admitting Date and Time: 1/15/2022 11:27 AM  Admit Dx: Hypertensive emergency [I16.1]    Subjective:  Patient is being followed for Hypertensive emergency [I16.1]     Patient states that he still feels dizzy however improved nausea. No chest pain or shortness of breath. ROS: denies fever, chills, cp, sob, n/v, HA unless stated above.       sodium chloride flush  5-40 mL IntraVENous 2 times per day    enoxaparin  40 mg SubCUTAneous Daily     sodium chloride flush, 5-40 mL, PRN  sodium chloride, 25 mL, PRN  ondansetron, 4 mg, Q8H PRN   Or  ondansetron, 4 mg, Q6H PRN  polyethylene glycol, 17 g, Daily PRN  acetaminophen, 650 mg, Q6H PRN   Or  acetaminophen, 650 mg, Q6H PRN  perflutren lipid microspheres, 1.5 mL, ONCE PRN  hydrALAZINE, 10 mg, Q4H PRN         Objective:    BP (!) 172/86   Pulse 61   Temp 98.8 °F (37.1 °C) (Oral)   Resp 17   Ht 5' 11\" (1.803 m)   Wt 233 lb 4 oz (105.8 kg)   SpO2 97%   BMI 32.53 kg/m²   General Appearance: alert and oriented to person, place and time and in no acute distress  Skin: warm and dry  Head: normocephalic and atraumatic  Eyes: pupils equal, round, and reactive to light, extraocular eye movements intact, conjunctivae normal  Neck: neck supple and non tender without mass   Pulmonary/Chest: clear to auscultation bilaterally- no wheezes, rales or rhonchi, normal air movement, no respiratory distress  Cardiovascular: normal rate, normal S1 and S2 and no carotid bruits  Abdomen: soft, non-tender, non-distended, normal bowel sounds, no masses or organomegaly  Extremities: no cyanosis, no clubbing and no edema  Neurologic: no cranial nerve deficit and speech normal        Recent Labs     01/15/22  1202 01/16/22  0535    137   K 3.6 3.3*    103   CO2 22 21*   BUN 19 16   CREATININE 1.1 1.0   GLUCOSE 108* 91   CALCIUM 9.2 9.4       Recent Labs     01/15/22  1202 01/16/22  0535   WBC 8.4 9.2   RBC 4.19 4.18   HGB 14.3 14.3   HCT 41.1 40.9   MCV 98.1 97.8   MCH 34.1 34.2   MCHC 34.8* 35.0*   RDW 12.1 12.4    189   MPV 11.4 11.2       Radiology: Echo pending    Assessment:    Active Problems:    Hypertensive emergency  Resolved Problems:    * No resolved hospital problems. *      Plan:    1. Vertigo with nausea due to #2  2. Hypertensive emergency - Okay to reduce blood pressure to normal now that it has been at least 24 hours. Currently on cardene along with hydralazine PRN. Would hold off on AV rohith blocking agents due to mobitz type II. Cardiology on board  3. Mobitz type II - Hold AV rohith blocking agents. Although stable would monitor with transcutaneous pacing pads. Will require ischemic workup. Echo as per cardiology. Cardiology on board. 3. DVT prophylaxis - Lovenox    NOTE: This report was transcribed using voice recognition software. Every effort was made to ensure accuracy; however, inadvertent computerized transcription errors may be present.     Electronically signed by Daya De La Fuente DO on 1/16/2022 at 7:51 AM

## 2022-01-16 NOTE — PATIENT CARE CONFERENCE
Intensive Care Daily Quality Rounding Checklist      ICU Team Members: Dr Meliton Hoyt, resident, TL, bedside RN, RT    ICU Day #: 2    Intubation Date: NA    Ventilator Day #: NA    Central Line Insertion Date: NA        Day #: NA     Arterial Line Insertion Date: NA      Day #: NA    Temporary Hemodialysis Catheter Insertion Date: NA      Day #: NA    DVT Prophylaxis: Lovenox    GI Prophylaxis: PO Diet    [place] Catheter Insertion Date: NA       Day #: NA      Continued need (if yes, reason documented and discussed with physician): NA    Skin Issues/ Wounds and ordered treatment discussed on rounds: NA    Goals/ Plans for the Day: Daily labs and transfuse/replace as needed, Wean Cardene as able, start PO anti-hypertensives. Echo. CPAP at night.

## 2022-01-16 NOTE — CONSULTS
Inpatient Cardiology Consultation      Reason for Consult:  Hypertensive emergency / Lukasz Mccormackgomery Physician: Dr. Gilda Rao     Requesting Physician:  Dr. Roxana Winkler    Date of Consultation: 1/16/2022    HISTORY OF PRESENT ILLNESS:   Mr. Yola Martinez is a 54year old male who is new to [facility] cardiology physicians. Patient has not followed regularly with a PCP in over 7 years. PMHx: History of appendectomy, probable YINA, tobacco abuse and EtOH use. 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 He recently got laid off for the month and has been mostly sedentary. He tested positive on a home test for COVID-19 on 1/2/2022 and had cold like symptoms, intermittent dizziness (lasting seconds) / no hypoxia or hospital admission. He had been feeling better over the past week but still did not regain his appetite. He reported feeling persistently lightheaded and \"off balance\" since 1/12/2022, worse with positional changes and walking, relieved with laying nearly flat in bed. He began developing a temporal headache x 4 days. Patient reported waking up at ~ 12 AM on 1/16/2022 and took his stone go and when he came back he was able to fall back to sleep. At approximately 2 AM he reported feeling \"dizzy and off balance\" with a worsening temporal headache that had progressively worsened over an hour. His mom instructed him to come into the [facility] 1/15/2022 for further evaluation. Upon arrival to the ED: /130, heart rate 63, afebrile, 99% on room air. Labs: Sodium 138, potassium 3.6, BUN 19, creatinine 1.1, lactic acid 1.0, proBNP 2274. High-sensitivity troponin 20, 19, WBC 8.4, H/H stable, platelet count 913. SARS-CoV-2: Negative. UA: Small bilirubin, proteinuria. CT head: No acute process. CXR: No acute findings.   CTA head: Inflammatory paranasal sinus disease with fluid level in the left frontal sinus, acute sinusitis with no other abnormality. CTA neck: No significant stenosis of internal carotid arteries. CT abdomen/pelvis: Unremarkable. Current medications: Benadryl, folic acid, hydralazine 10 mg IV x1, labetalol 20 mg IV x1, morphine IV, Reglan, potassium chloride 40 mill equivalents x1, thiamine. Patient was started on nicardipine drip IV and admitted to ICU. Currently, patient is on nicardipine IV drip at 5 mg/hr. blood pressure 174/82. Please note: past medical records were reviewed per electronic medical record (EMR) - see detailed reports under Past Medical/ Surgical History. Past Medical History:    1. Hx of Appendectomy   2. Current smoker: 1 PPD x 30 pack years   3. ETOH use: 2-3 beers/day. 4. Hx of right wrist injury. 5. Hx of COVID-19 infection (home test) / Unvaccinated. 6. Probable YINA      Medications Prior to admit:  Prior to Admission medications    Not on File       Current Medications:    Current Facility-Administered Medications: niCARdipine (CARDENE) 25 mg in sodium chloride 0.9 % 250 mL infusion, 3-15 mg/hr, IntraVENous, Continuous  sodium chloride flush 0.9 % injection 5-40 mL, 5-40 mL, IntraVENous, 2 times per day  sodium chloride flush 0.9 % injection 5-40 mL, 5-40 mL, IntraVENous, PRN  0.9 % sodium chloride infusion, 25 mL, IntraVENous, PRN  enoxaparin (LOVENOX) injection 40 mg, 40 mg, SubCUTAneous, Daily  ondansetron (ZOFRAN-ODT) disintegrating tablet 4 mg, 4 mg, Oral, Q8H PRN **OR** ondansetron (ZOFRAN) injection 4 mg, 4 mg, IntraVENous, Q6H PRN  polyethylene glycol (GLYCOLAX) packet 17 g, 17 g, Oral, Daily PRN  acetaminophen (TYLENOL) tablet 650 mg, 650 mg, Oral, Q6H PRN **OR** acetaminophen (TYLENOL) suppository 650 mg, 650 mg, Rectal, Q6H PRN  perflutren lipid microspheres (DEFINITY) injection 1.65 mg, 1.5 mL, IntraVENous, ONCE PRN  hydrALAZINE (APRESOLINE) injection 10 mg, 10 mg, IntraVENous, Q4H PRN    Allergies:  Patient has no known allergies.     Social History:    Current smoker: 1 PPD x 30 pack years   ETOH: 2-3 beers/day. Denies illicit drug use. Family History: Mother: living, with no known history of CAD. Father: DM  Sister: Hx of HTN    REVIEW OF SYSTEMS:     · Constitutional: + fatigue. Denies fevers, chills or night sweats  · Eyes: Denies visual changes or drainage  · ENT: + headaches. Denies hearing loss. No mouth sores or sore throat. No epistaxis   · Cardiovascular: See HPI. · Respiratory: Denies FELIPE, cough, orthopnea or PND. No hemoptysis   · Gastrointestinal: Denies hematemesis or anorexia. No hematochezia or melena    · Genitourinary: Denies urgency, dysuria or hematuria. · Musculoskeletal: Denies gait disturbance, weakness or joint complaints  · Integumentary: Denies rash, hives or pruritis   · Neurological: Denies dizziness. +headaches. Denies seizures. No numbness or tingling  · Psychiatric: Denies anxiety or depression. · Endocrine: Denies temperature intolerance. No recent weight change. .  · Hematologic/Lymphatic: Denies abnormal bruising or bleeding. No swollen lymph nodes    PHYSICAL EXAM:   BP (!) 172/86   Pulse 61   Temp 98.8 °F (37.1 °C) (Oral)   Resp 17   Ht 5' 11\" (1.803 m)   Wt 233 lb 4 oz (105.8 kg)   SpO2 97%   BMI 32.53 kg/m²   CONST:  Well developed, well nourished middle aged male who appears of stated age. Awake, alert and cooperative. No apparent distress. HEENT:   Head- Normocephalic, atraumatic   Eyes- Conjunctivae pink, anicteric  Throat- Oral mucosa pink and moist  Neck-  No stridor, trachea midline, no jugular venous distention. No carotid bruit. CHEST: Chest symmetrical and non-tender to palpation. No accessory muscle use or intercostal retractions  RESPIRATORY: Lung sounds - clear throughout fields. On RA. CARDIOVASCULAR:     Heart Inspection- shows no noted pulsations  Heart Palpation- no heaves or thrills; PMI is non-displaced   Heart Ausculation- Regular rate and rhythm, no murmur.  No s3, s4 or rub   PV: No lower extremity edema. No varicosities. Pedal pulses palpable, no clubbing or cyanosis   ABDOMEN: Soft, non-tender to light palpation. Bowel sounds present. No palpable masses no organomegaly; no abdominal bruit  MS: Good muscle strength and tone. No atrophy or abnormal movements. : Deferred  SKIN: Warm and dry no statis dermatitis or ulcers   NEURO / PSYCH: Oriented to person, place and time. Speech clear and appropriate. Follows all commands. Pleasant affect     DATA:    ECG: See HPI. Tele strips: SB/SR with intermittent 2:1 AV block. Diagnostic:      Intake/Output Summary (Last 24 hours) at 1/16/2022 0625  Last data filed at 1/16/2022 0600  Gross per 24 hour   Intake 550 ml   Output 900 ml   Net -350 ml       Labs:   CBC:   Recent Labs     01/15/22  1202 01/16/22  0535   WBC 8.4 9.2   HGB 14.3 14.3   HCT 41.1 40.9    189     BMP:   Recent Labs     01/15/22  1202      K 3.6   CO2 22   BUN 19   CREATININE 1.1   LABGLOM >60   CALCIUM 9.2     Mag: No results for input(s): MG in the last 72 hours. Phos: No results for input(s): PHOS in the last 72 hours. TFT: No results found for: TSH, C0BKITG, E7CCIZQ, THYROIDAB, FT3, T4FREE   HgA1c: No results found for: LABA1C  No results found for: EAG  proBNP:   Recent Labs     01/15/22  1202   PROBNP 2,274*     PT/INR: No results for input(s): PROTIME, INR in the last 72 hours. APTT:No results for input(s): APTT in the last 72 hours.   CARDIAC ENZYMES:  Recent Labs     01/15/22  1202 01/15/22  1348   TROPHS 20* 19*     FASTING LIPID PANEL:No results found for: CHOL, HDL, LDLDIRECT, LDLCALC, TRIG  LIVER PROFILE:  Recent Labs     01/15/22  1202   AST 20   ALT 26   LABALBU 4.2     CT head without contrast: 1/15/2022  No acute intracranial abnormality.  Specifically, there is no acute   intracranial hemorrhage         The 10-year ASCVD risk score (Basilia Mcdaniels, et al., 2013) is: 19.7%    Values used to calculate the score:      Age: 54 years      Sex: Male Is Non- : No      Diabetic: No      Tobacco smoker: Yes      Systolic Blood Pressure: 354 mmHg      Is BP treated: Yes      HDL Cholesterol: 36 mg/dL      Total Cholesterol: 204 mg/dL      Assessment/Plan:  1. Sinus bradycardia/Sinus rhythm with episodes of 2:1 AV block during sleeping hours with increased vagal tone secondary to probable YINA. No clinical indication for PPM at this time. TSH normal  2. Hypertensive emergency: on Nicardipine gtt. Untreated HTN as an outpatient (he does not follow with a PCP regularly). BP improving. 3. Lightheadedness / Dizziness / headache most likely secondary to #2. 4. Tobacco abuse: 1 PPD x 30 years  5. Daily ETOH use   6. Obesity: BMI 32.53 kg/m2  7. Probable YINA     8. Hx of Right wrist injury s/p surgery   9. HLD  10. ASCVD risk score 19.7%  11. RBBB  12. Hypokalemia -- K 3.3 (normal Mg)  13. Moderate LVH      · Start Norvasc 10 mg QD  · Start Lisinopril 10 mg QD  · Up-titrate antihypertensive medications   · Wean Nicardipine IV gtt to off as BP tolerates  · Start Lipitor due to elevated ASCVD risk   · Monitor telemetry for arrhythmias   · Check 2D ECHO  · Check Hgb A1c  · Discuss outpatient cardiac monitor   · Aggressive risk factor modification   · Smoking cessation   · Will follow     The above assessment and treatment plan were made in collaboration with Dr. Karthikeyan Staton.    Electronically signed by NAYVA Newman CNP on 1/16/22 at 2:03 PM EST    Thank you for this consultation. ___________________________________________________________________________  I independently interviewed and examined the patient. I have reviewed the above documentation completed by the MAEGAN. Please see my additional contributions to the HPI, physical exam, and assessment / medical decision making.     HPI, ROS, PMH, PSH, [address], [place], and medications independently reviewed (agree; see above documentation)    History of Present Illness:  Currently with no chest pain, respiratory distress, or palpitations. SB/SR on EKG and telemetry. Episodes of 2:1 AV block noted (including overnight) -- currently with no high degree AV block. Review of Systems:   Cardiac: As per HPI  General: No fever, chills  Pulmonary: As per HPI  HEENT: No visual disturbances, difficult swallowing  GI: No nausea, vomiting  : No dysuria, hematuria  Endocrine: No thyroid disease or DM  Musculoskeletal: HAYNES x 4, no focal motor deficits  Skin: Intact, no rashes  Neuro: No headache, seizures  Psych: Currently with no depression, anxiety    Physical Exam:  BP (!) 185/83   Pulse 69   Temp 98.8 °F (37.1 °C) (Oral)   Resp 13   Ht 5' 11\" (1.803 m)   Wt 233 lb 4 oz (105.8 kg)   SpO2 98%   BMI 32.53 kg/m²   Wt Readings from Last 3 Encounters:   01/15/22 233 lb 4 oz (105.8 kg)     Appearance: Awake, alert, no acute respiratory distress  Skin: Intact, no rash  Head: Normocephalic, atraumatic  Eyes: EOMI, no conjunctival erythema  ENMT: No pharyngeal erythema, MMM, no rhinorrhea  Neck: Supple, no elevated JVP, no carotid bruits  Lungs: Clear to auscultation bilaterally. No wheezes, rales, or rhonchi.   Cardiac: Regular rate and rhythm, +S1S2, no murmurs apparent  Abdomen: Soft, nontender, +bowel sounds  Extremities: Moves all extremities x 4, no lower extremity edema  Neurologic: No focal motor deficits apparent, normal mood and affect    Laboratory Tests:  Recent Labs     01/15/22  1202 01/16/22  0535    137   K 3.6 3.3*    103   CO2 22 21*   BUN 19 16   CREATININE 1.1 1.0   GLUCOSE 108* 91   CALCIUM 9.2 9.4     Lab Results   Component Value Date    MG 2.2 01/16/2022     Recent Labs     01/15/22  1202   ALKPHOS 93   ALT 26   AST 20   PROT 7.1   BILITOT 0.5   LABALBU 4.2     Recent Labs     01/15/22  1202 01/16/22  0535   WBC 8.4 9.2   RBC 4.19 4.18   HGB 14.3 14.3   HCT 41.1 40.9   MCV 98.1 97.8   MCH 34.1 34.2   MCHC 34.8* 35.0*   RDW 12.1 12.4    189   MPV 11.4 11.2     No results found for: CKTOTAL, CKMB, CKMBINDEX, TROPONINI  Recent Labs     01/15/22  1202 01/15/22  1348   TROPHS 20* 19*     Lab Results   Component Value Date    TSH 1.310 01/16/2022     No results found for: LABA1C  No results found for: EAG  Lab Results   Component Value Date    CHOL 204 (H) 01/16/2022     Lab Results   Component Value Date    TRIG 181 (H) 01/16/2022     Lab Results   Component Value Date    HDL 36 01/16/2022     Lab Results   Component Value Date    LDLCALC 132 (H) 01/16/2022     Lab Results   Component Value Date    LABVLDL 36 01/16/2022     No results found for: CHOLHDLRATIO  Recent Labs     01/15/22  1202   PROBNP 2,274*       Cardiac Tests:  EKG reviewed: SB, rate 38, 2:1 AV block, RBBB    EKG reviewed: SB, rate 57, RBBB    Telemetry reviewed (date: 1/16/2022): SR, rate 60's, episodes of 2:1 AV block overnight while sleeping    Echocardiogram reviewed: 1/16/22   Normal left ventricular systolic function. Ejection fraction is visually estimated at 60%. Moderate concentric left ventricular hypertrophy. Dilated right ventricle and normal right ventricular function (TAPSE 2.9 cm). There is doppler evidence of stage II diastolic dysfunction. No evidence of hemodynamically significant valve disease. Unable to estimate PASP due to incomplete tricuspid regurgitation   envelope.     - Untreated HTN as an outpatient (he does not follow with a PCP)  - Start norvasc  - Start lisinopril  - Wean off cardene drip  - Avoid AV rohith blocking agents  - Continue to up-titrate anti-HTN regimen as needed  - Replace K (normal Mg)  - Results of today's echocardiogram outlined above  - Outpatient sleep study --> cardiac complications associated with untreated YINA reviewed today  - Check Hgb A1c  - Aggressive risk factor modifications / counseled today re: tobacco and ETOH cessation  - Monitor telemetry (currently SR, rate 60's, no high degree AV block)  - Discussed option of outpatient cardiac monitoring  - Case discussed with ICU team    Greater than 70 minutes was spent counseling the patient, reviewing the rationale for the above recommendations and reviewing the patient's current medication list, problem list and results of all previously ordered testing. Thank you for allowing me to participate in your patient's care. Please feel free to contact me if you have any questions or concerns.     Juli Marley MD  [facility]) Cardiology

## 2022-01-16 NOTE — H&P
[facility] History and Physical          ASSESSMENT:      Active Problems:    Hypertensive emergency  Resolved Problems:    * No resolved hospital problems. *      PLAN:    1. Hypertensive emergency  Patient started on Cardene drip. It is assumed this is the cause of his vertiginous symptoms. Will see how he feels after blood pressure is better regulated. Will defer to work-up for secondary causes of hypertension to cardiology. Echocardiogram has been ordered. Hydralazine IV as needed. Management of Cardene drip per critical care team.    2. Mobitz two heart block  Cardiology consulted. Patient on telemetry. Will need ischemic evaluation at some time. Code Status: Full  DVT prophylaxis: Lovenox      CHIEF COMPLAINT:  Dizziness with nausea    History of Present Illness: 59-year-old male with no past medical history although he is not seen any physicians in some time. Patient states he does have to do physicals for work and is never known his blood pressure to be elevated based on these. He states he contracted COVID which he tested positive for with a home test.  At that time patient with cough, diarrhea, and muscle aches and malaise. The symptoms mostly improved although he does still have liquid stools. He states that two nights ago he woke up at midnight to use the restroom and was fine. He woke up 2 hours later due to his dogs and he had severe vertiginous type symptoms with nausea with any movement. He spent the last 2 days on the couch and symptoms have become so severe he decided to come to the emergency room. In the ER blood pressures were greatly elevated eventually necessitating treatment. Currently before this patient underwent a CT scan of the brain which did not show any bleeding and so he underwent an LP. This was negative. After this he did a CT angiogram of the head and neck which was again negative.   At this time patient was noted to be with heart rates going down into the 30s. He does seem to be having heart block with P waves without QRS, and this was eventually determined to be Mobitz two. Due to his greatly elevated blood pressures patient was then placed on a Cardene drip. Blood pressure is still 190s over 90s when I am in the room. He said overall his symptoms are much improved except for severe headache. He was accepted for intensive care unit. Informant(s) for H&P:Patient    REVIEW OF SYSTEMS:  A comprehensive review of systems was negative except for: what is in the HPI    PMH:  History reviewed. No pertinent past medical history. Surgical History:  Past Surgical History:   Procedure Laterality Date    APPENDECTOMY         Medications Prior to Admission:    Prior to Admission medications    Not on File       Allergies:    Patient has no known allergies. Social History:    reports that he has been smoking cigarettes. He has been smoking about 1.00 pack per day. He has never used smokeless tobacco. He reports current alcohol use. He reports that he does not use drugs. Family History:   family history is not on file.    Several family members with HTN      PHYSICAL EXAM:  Vitals:  BP (!) 156/76   Pulse 67   Temp 98 °F (36.7 °C) (Oral)   Resp 16   Ht 5' 11\" (1.803 m)   Wt 235 lb (106.6 kg)   SpO2 96%   BMI 32.78 kg/m²   General Appearance: alert and oriented to person, place and time and in no acute distress  Skin: warm and dry  Head: normocephalic and atraumatic  Eyes: pupils equal, round, and reactive to light, extraocular eye movements intact, conjunctivae normal  Neck: neck supple and non tender  Pulmonary/Chest: clear to auscultation bilaterally  Cardiovascular: distant, regular, no murmur heard  Abdomen: soft, non-tender, non-distended  Extremities: no edema  Neurologic: grossly non-focal    LABS:  Recent Labs     01/15/22  1202      K 3.6      CO2 22   BUN 19   CREATININE 1.1   GLUCOSE 108*   CALCIUM 9.2 Recent Labs     01/15/22  1202   WBC 8.4   RBC 4.19   HGB 14.3   HCT 41.1   MCV 98.1   MCH 34.1   MCHC 34.8*   RDW 12.1      MPV 11.4       Radiology:   CTA HEAD W CONTRAST   Final Result   1. Inflammatory paranasal sinus disease with fluid level in the left frontal   sinus. Correlate with presentation for acute sinusitis. 2. No acute hemorrhage or large intracranial mass effect. 3. No large vessel intracranial occlusion or high grade stenosis. 4. Other findings as described. CTA NECK W CONTRAST   Final Result   1. No significant stenoses of the internal carotid arteries. 2. Other findings as described. CT ABDOMEN PELVIS W IV CONTRAST Additional Contrast? None   Final Result   No definitive findings to explain the patient's abdominal pain. XR CHEST PORTABLE   Final Result   1. There are no findings of pneumonia or failure. CT Head WO Contrast   Final Result   No acute intracranial abnormality. Specifically, there is no acute   intracranial hemorrhage             EKG: Mild sinus bradycardia with right bundle branch block      NOTE: This report was transcribed using voice recognition software. Every effort was made to ensure accuracy; however, inadvertent computerized transcription errors may be present.   Electronically signed by Willena Bence, MD on 1/15/2022 at 9:35 PM

## 2022-01-16 NOTE — CONSULTS
abdominal pain, nausea and vomiting. Endocrine: Negative for cold intolerance and heat intolerance. Genitourinary: Negative for difficulty urinating and dysuria. Musculoskeletal: Negative. Skin: Negative for color change and rash. Allergic/Immunologic: Negative for environmental allergies and immunocompromised state. Neurological: Positive for headaches. Negative for dizziness and weakness. Hematological: Negative. Psychiatric/Behavioral: Negative for agitation and confusion. PHYSICAL EXAMINATION:     Vital signs:   height is 5' 11\" (1.803 m) and weight is 233 lb 4 oz (105.8 kg). His oral temperature is 99.1 °F (37.3 °C). His blood pressure is 182/88 (abnormal) and his pulse is 68. His respiration is 17 and oxygen saturation is 99%. I/O last 3 completed shifts: In: 550 [I.V.:550]  Out: 900 [Urine:900]      Physical Exam  Constitutional:       General: He is not in acute distress. HENT:      Head: Normocephalic and atraumatic. Mouth/Throat:      Pharynx: No oropharyngeal exudate. Eyes:      General: No scleral icterus. Conjunctiva/sclera: Conjunctivae normal.   Neck:      Trachea: No tracheal deviation. Cardiovascular:      Rate and Rhythm: Normal rate. Heart sounds: Normal heart sounds. Pulmonary:      Effort: Pulmonary effort is normal.      Breath sounds: Normal breath sounds. Abdominal:      Palpations: Abdomen is soft. Tenderness: There is no abdominal tenderness. Musculoskeletal:         General: No swelling or deformity. Cervical back: Neck supple. Lymphadenopathy:      Cervical: No cervical adenopathy. Skin:     General: Skin is warm. Findings: No rash. Neurological:      General: No focal deficit present. Mental Status: He is alert and oriented to person, place, and time. Psychiatric:         Behavior: Behavior normal.         PERTINENT LAB RESULTS: Labs reviewed.       DIAGNOSTICS:  Pertinent imaging reviewed. ASSESMENT/PLAN:     Assessment:    1. Hypertensive emergency  2. Tobacco use  3. Suspect sleep apnea    Consultants: Cards     Oral antihypertensive medications that started with Norvasc and lisinopril. Will likely need these to be uptitrated. Wean off Cardene as blood pressure tolerates.  Continue telemetry   2D echo   CPAP nightly. Will need full sleep study as outpatient. GI/DVT - SUP/Lovenox 40 mg        Code Status: Full Code    Total critical care time spent reviewing chart records and managing patient at the bedside is 32 minutes exclusive of procedures or overlap.     ELECTRONICALLY SIGNED:  Susan Zelaya DO

## 2022-01-16 NOTE — FLOWSHEET NOTE
Patient admitted from ER to 212, with the following belongings: clothes,hat, silver metal ring, cell phone; placed on monitor, patient oriented to room and unit visiting hours. Patient guide at bedside, reviewed patient rights and responsibilities. MRSA nasal swab obtained. Bed alarm on. Call light within reach.

## 2022-01-16 NOTE — PLAN OF CARE
Problem: Pain:  Goal: Pain level will decrease  Description: Pain level will decrease  Outcome: Met This Shift  Goal: Control of acute pain  Description: Control of acute pain  Outcome: Met This Shift  Goal: Control of chronic pain  Description: Control of chronic pain  Outcome: Met This Shift     Problem: Anxiety/Stress:  Goal: Level of anxiety will decrease  Description: Level of anxiety will decrease  Outcome: Met This Shift     Problem: Cardiac Output - Decreased:  Goal: Hemodynamic stability will improve  Description: Hemodynamic stability will improve  Outcome: Met This Shift     Problem: Pain:  Goal: Pain level will decrease  Description: Pain level will decrease  Outcome: Met This Shift  Goal: Recognizes and communicates pain  Description: Recognizes and communicates pain  Outcome: Met This Shift  Goal: Control of acute pain  Description: Control of acute pain  Outcome: Met This Shift  Goal: Control of chronic pain  Description: Control of chronic pain  Outcome: Met This Shift     Problem: Tissue Perfusion - Cardiopulmonary, Altered:  Goal: Absence of angina  Description: Absence of angina  Outcome: Met This Shift  Goal: Hemodynamic stability will improve  Description: Hemodynamic stability will improve  Outcome: Met This Shift

## 2022-01-17 LAB
ANION GAP SERPL CALCULATED.3IONS-SCNC: 12 MMOL/L (ref 7–16)
BASOPHILS ABSOLUTE: 0.09 E9/L (ref 0–0.2)
BASOPHILS RELATIVE PERCENT: 1 % (ref 0–2)
BUN BLDV-MCNC: 26 MG/DL (ref 6–20)
CALCIUM SERPL-MCNC: 9.3 MG/DL (ref 8.6–10.2)
CHLORIDE BLD-SCNC: 103 MMOL/L (ref 98–107)
CO2: 19 MMOL/L (ref 22–29)
CREAT SERPL-MCNC: 0.9 MG/DL (ref 0.7–1.2)
EOSINOPHILS ABSOLUTE: 0.24 E9/L (ref 0.05–0.5)
EOSINOPHILS RELATIVE PERCENT: 2.6 % (ref 0–6)
GFR AFRICAN AMERICAN: >60
GFR NON-AFRICAN AMERICAN: >60 ML/MIN/1.73
GLUCOSE BLD-MCNC: 102 MG/DL (ref 74–99)
HCT VFR BLD CALC: 41.8 % (ref 37–54)
HEMOGLOBIN: 14.1 G/DL (ref 12.5–16.5)
IMMATURE GRANULOCYTES #: 0.02 E9/L
IMMATURE GRANULOCYTES %: 0.2 % (ref 0–5)
LYMPHOCYTES ABSOLUTE: 1.42 E9/L (ref 1.5–4)
LYMPHOCYTES RELATIVE PERCENT: 15.6 % (ref 20–42)
MAGNESIUM: 2.2 MG/DL (ref 1.6–2.6)
MCH RBC QN AUTO: 33.4 PG (ref 26–35)
MCHC RBC AUTO-ENTMCNC: 33.7 % (ref 32–34.5)
MCV RBC AUTO: 99.1 FL (ref 80–99.9)
MONOCYTES ABSOLUTE: 0.88 E9/L (ref 0.1–0.95)
MONOCYTES RELATIVE PERCENT: 9.7 % (ref 2–12)
MRSA CULTURE ONLY: NORMAL
NEUTROPHILS ABSOLUTE: 6.46 E9/L (ref 1.8–7.3)
NEUTROPHILS RELATIVE PERCENT: 70.9 % (ref 43–80)
PDW BLD-RTO: 12.4 FL (ref 11.5–15)
PHOSPHORUS: 3.3 MG/DL (ref 2.5–4.5)
PLATELET # BLD: 214 E9/L (ref 130–450)
PMV BLD AUTO: 11.6 FL (ref 7–12)
POTASSIUM SERPL-SCNC: 4.2 MMOL/L (ref 3.5–5)
RBC # BLD: 4.22 E12/L (ref 3.8–5.8)
SODIUM BLD-SCNC: 134 MMOL/L (ref 132–146)
WBC # BLD: 9.1 E9/L (ref 4.5–11.5)

## 2022-01-17 PROCEDURE — 6370000000 HC RX 637 (ALT 250 FOR IP): Performed by: INTERNAL MEDICINE

## 2022-01-17 PROCEDURE — 2580000003 HC RX 258: Performed by: INTERNAL MEDICINE

## 2022-01-17 PROCEDURE — 6370000000 HC RX 637 (ALT 250 FOR IP): Performed by: CLINICAL NURSE SPECIALIST

## 2022-01-17 PROCEDURE — 83735 ASSAY OF MAGNESIUM: CPT

## 2022-01-17 PROCEDURE — 6360000002 HC RX W HCPCS: Performed by: INTERNAL MEDICINE

## 2022-01-17 PROCEDURE — 94660 CPAP INITIATION&MGMT: CPT

## 2022-01-17 PROCEDURE — 36415 COLL VENOUS BLD VENIPUNCTURE: CPT

## 2022-01-17 PROCEDURE — 85025 COMPLETE CBC W/AUTO DIFF WBC: CPT

## 2022-01-17 PROCEDURE — 80048 BASIC METABOLIC PNL TOTAL CA: CPT

## 2022-01-17 PROCEDURE — 6370000000 HC RX 637 (ALT 250 FOR IP): Performed by: NURSE PRACTITIONER

## 2022-01-17 PROCEDURE — 99233 SBSQ HOSP IP/OBS HIGH 50: CPT | Performed by: INTERNAL MEDICINE

## 2022-01-17 PROCEDURE — 84100 ASSAY OF PHOSPHORUS: CPT

## 2022-01-17 PROCEDURE — APPSS15 APP SPLIT SHARED TIME 0-15 MINUTES: Performed by: CLINICAL NURSE SPECIALIST

## 2022-01-17 PROCEDURE — 1200000000 HC SEMI PRIVATE

## 2022-01-17 PROCEDURE — 6360000002 HC RX W HCPCS: Performed by: CLINICAL NURSE SPECIALIST

## 2022-01-17 RX ORDER — HYDRALAZINE HYDROCHLORIDE 50 MG/1
50 TABLET, FILM COATED ORAL 3 TIMES DAILY
Status: DISCONTINUED | OUTPATIENT
Start: 2022-01-17 | End: 2022-01-18

## 2022-01-17 RX ORDER — LISINOPRIL 20 MG/1
20 TABLET ORAL DAILY
Status: DISCONTINUED | OUTPATIENT
Start: 2022-01-18 | End: 2022-01-19 | Stop reason: HOSPADM

## 2022-01-17 RX ORDER — LISINOPRIL 10 MG/1
10 TABLET ORAL ONCE
Status: COMPLETED | OUTPATIENT
Start: 2022-01-17 | End: 2022-01-17

## 2022-01-17 RX ADMIN — Medication 10 ML: at 21:14

## 2022-01-17 RX ADMIN — ENOXAPARIN SODIUM 40 MG: 100 INJECTION SUBCUTANEOUS at 08:37

## 2022-01-17 RX ADMIN — LISINOPRIL 10 MG: 10 TABLET ORAL at 08:37

## 2022-01-17 RX ADMIN — Medication 10 ML: at 08:37

## 2022-01-17 RX ADMIN — AMLODIPINE BESYLATE 10 MG: 10 TABLET ORAL at 08:37

## 2022-01-17 RX ADMIN — HYDRALAZINE HYDROCHLORIDE 50 MG: 50 TABLET, FILM COATED ORAL at 13:26

## 2022-01-17 RX ADMIN — HYDRALAZINE HYDROCHLORIDE 50 MG: 50 TABLET, FILM COATED ORAL at 21:14

## 2022-01-17 RX ADMIN — LISINOPRIL 10 MG: 10 TABLET ORAL at 10:57

## 2022-01-17 RX ADMIN — ATORVASTATIN CALCIUM 40 MG: 40 TABLET, FILM COATED ORAL at 21:14

## 2022-01-17 RX ADMIN — ACETAMINOPHEN 650 MG: 325 TABLET ORAL at 12:57

## 2022-01-17 RX ADMIN — HYDRALAZINE HYDROCHLORIDE 10 MG: 20 INJECTION INTRAMUSCULAR; INTRAVENOUS at 10:58

## 2022-01-17 RX ADMIN — HYDRALAZINE HYDROCHLORIDE 10 MG: 20 INJECTION INTRAMUSCULAR; INTRAVENOUS at 02:16

## 2022-01-17 ASSESSMENT — PAIN SCALES - GENERAL
PAINLEVEL_OUTOF10: 0

## 2022-01-17 NOTE — CARE COORDINATION
Met with patient about diagnosis and discharge plan of care. Pt admit for hypertensive emergency. Pt had COVID couple weeks ago. Pt admit ICU, now for transfer to intermediate. Cardio consult. Pt lives with spouse in 2 story home. Spouse has been sick with cancer and pt has been taking care of her. He is independent, has no DME or home care services. Pt does NOT have PCP, preservice line added to discharge instructions.  Will follow-mjo

## 2022-01-17 NOTE — PLAN OF CARE
angina  1/16/2022 1551 by Ivon Venegas RN  Outcome: Met This Shift  Goal: Hemodynamic stability will improve  Description: Hemodynamic stability will improve  1/16/2022 1551 by Ivon Venegas RN  Outcome: Met This Shift     Problem: Discharge Planning:  Goal: Discharged to appropriate level of care  Description: Discharged to appropriate level of care  Outcome: Not Met This Shift     Problem: Tissue Perfusion, Altered:  Goal: Circulatory function within specified parameters  Description: Circulatory function within specified parameters  Outcome: Not Met This Shift

## 2022-01-17 NOTE — PROGRESS NOTES
Inpatient Cardiology Progress Note     We are following the patient for hypertension and AV block     Haydee Hicks is a 54 y.o. male who was seen in consultation by Dr. Rachael Moralez on January 16, 2022. SUBJECTIVE: He denies chest pain or dyspnea. He continues to have dizziness which is more pronounced when out of bed but also has some dizziness when turning his head. Prior to admission, he had occasional fluttering in his chest.     OBJECTIVE: No apparent distress, sitting up in bed. ROS:  Consist: Denies fevers, chills or night sweats  Heart: Denies chest pain, palpitations, lightheadedness, dizziness or syncope  Lungs: Denies SOB, cough, wheezing, orthopnea or PND  GI: Denies abdominal pain, vomiting or diarrhea  All others negative     PHYSICAL EXAM:   BP (!) 172/83   Pulse 70   Temp 98 °F (36.7 °C) (Oral)   Resp 18   Ht 5' 11\" (1.803 m)   Wt 233 lb 4 oz (105.8 kg)   SpO2 98%   BMI 32.53 kg/m²    B/P Range last 24 hours: Systolic (94RSX), JHL:225 , Min:152 , FXL:733    Diastolic (80GGJ), QLR:13, Min:76, Max:102  BP range today 157/76 to 194/97   CONST: Well developed, well nourished middle aged gentleman who appears of stated age. Awake, alert and cooperative. No apparent distress  HEENT:   Head- Normocephalic, atraumatic   Throat- Oral mucosa pink and moist   Neck-  Thick neck. No JVD or carotid bruit  CHEST: Chest symmetrical and non-tender to palpation. Respirations unlabored. RESPIRATORY:   Breath sounds clear throughout   CARDIOVASCULAR:     Heart Ausculation- Regular rate and rhythm, no murmur, s3, s4 or rub   PV: No lower extremity edema. No varicosities. ABDOMEN: Soft, non-tender to light palpation. Bowel sounds present. No palpable masses no   MS: Good muscle strength and tone. No atrophy or abnormal movements. : Deferred  SKIN: Warm and dry  NEURO / PSYCH: Oriented to person, place and time. Speech clear and appropriate. Follows all commands.  Pleasant affect     Telemetry: SB/SR with 2:1 AV block overnight. Longest pause 2.27 seconds at 1243 am       Intake/Output Summary (Last 24 hours) at 1/17/2022 0847  Last data filed at 1/16/2022 1735  Gross per 24 hour   Intake 581 ml   Output 400 ml   Net 181 ml       Weight:   Wt Readings from Last 3 Encounters:   01/15/22 233 lb 4 oz (105.8 kg)     Current Inpatient Medications:   amLODIPine  10 mg Oral Daily    lisinopril  10 mg Oral Daily    atorvastatin  40 mg Oral Nightly    nicotine  1 patch TransDERmal Daily    sodium chloride flush  5-40 mL IntraVENous 2 times per day    enoxaparin  40 mg SubCUTAneous Daily       IV Infusions (if any):   niCARdipine Stopped (01/16/22 1435)    sodium chloride         DIAGNOSTIC/ LABORATORY DATA:  Labs:   CBC:   Recent Labs     01/16/22  0535 01/17/22  0535   WBC 9.2 9.1   HGB 14.3 14.1   HCT 40.9 41.8    214     BMP:   Recent Labs     01/16/22  0535 01/17/22  0535    134   K 3.3* 4.2   CO2 21* 19*   BUN 16 26*   CREATININE 1.0 0.9   LABGLOM >60 >60   CALCIUM 9.4 9.3     Mag:   Recent Labs     01/16/22  0535 01/17/22  0535   MG 2.2 2.2     Phos:   Recent Labs     01/16/22  0535 01/17/22  0535   PHOS 3.9 3.3     TFT:   Lab Results   Component Value Date    TSH 1.310 01/16/2022      HgA1c:   Lab Results   Component Value Date    LABA1C 5.3 01/16/2022     CARDIAC ENZYMES:  Recent Labs     01/15/22  1202 01/15/22  1348   TROPHS 20* 19*     FASTING LIPID PANEL:  Lab Results   Component Value Date    CHOL 204 01/16/2022    HDL 36 01/16/2022    LDLCALC 132 01/16/2022    TRIG 181 01/16/2022     LIVER PROFILE:  Recent Labs     01/15/22  1202   AST 20   ALT 26   LABALBU 4.2       CTA neck with contrast 1/15/2022:   FINDINGS:   AORTIC ARCH/ARCH VESSELS: Motion degraded evaluation of the imaged aorta   origins of the innominate, brachiocephalic, and subclavian arteries appear patent. CAROTID ARTERIES:   Right common carotid artery: Patent without focal stenosis.    Right internal carotid artery: No significant stenosis by NASCET criteria. Right external carotid artery origin: Patent without focal stenosis. Left common carotid artery: Patent without focal stenosis. Left internal carotid artery: No significant stenosis by NASCET criteria. Left external carotid artery origin: Patent without focal stenosis. VERTEBRAL ARTERIES:   Right vertebral artery: Patent without focal stenosis. Left vertebral artery: Patent without focal stenosis. Dominant. SOFT TISSUES: Visualized lung apices are clear.  No adenopathy in the neck and visualized chest. Numerous dental caries and periapical lucencies noted. Prominent adenoids and palatine tonsils. BONES: Scattered degenerative changes noted in the visualized spine without   spondylolisthesis. CXR 1/15/2022:   FINDINGS:   The cardiac silhouette is within normal limits. The right lung is clear. There is a linear density seen within the left upper lobe which could represent linear atelectasis. Advid Letters is no left lung infiltrate.  There is no right or left pleural effusion. Echo 1/15/2022 (Dr. Nelly Lawson):  Summary   Normal left ventricular systolic function. Ejection fraction is visually estimated at 60%. Moderate concentric left ventricular hypertrophy. Dilated right ventricle and normal right ventricular function (TAPSE 2.9  cm). There is doppler evidence of stage II diastolic dysfunction. No evidence of hemodynamically significant valve disease. Unable to estimate PASP due to incomplete tricuspid regurgitation  envelope. Assessment:   1. Intermittent SB and  2:1 AV block, mostly overnight   · Likely due to  increased vagal tone secondary to probable YINA. · No clinical indication for PPM at this time. · Normal TSH  2. Hypertensive emergency:   · Cardene now weaned off   3. Lightheadedness / Dizziness / headache most likely secondary to #2. 4. Moderate LVH with stage I diastolic dysfunction   5.  Right BBB  6. Hyperlipidemia  7. ASCVD risk score 19.7%  8. Tobacco abuse: 1 PPD x 30 years  9. Daily ETOH use: 4 to 5 beers daily   10. Obesity: BMI 32.53 kg/m2  11. Probable YINA     12. Prerenal azotemia      Treatment Plan:   1. Avoid AV rohith blocking agents  2. Increase Lisinopril to 20 mg daily   3. Change Hydralazine to PRN for SBP greater than 140 mm hg  4. Will hold on adding thiazide for now due to elevated BUN  5. Check orthostatic vital signs   6. Once out of ICU, will ambulate in hallway to assess chronotropic competence/observe conduction   7. 30 day event monitor at discharge   8. Outpatient sleep studies  9. He was counseled on the need for tobacco cessation.        Electronically signed by NAVYA Sorenson on 1/17/2022 at 8:47 AM

## 2022-01-17 NOTE — PATIENT CARE CONFERENCE
Intensive Care Daily Quality Rounding Checklist        ICU Team Members: Dr Siomara Giron, resident, TL, bedside RN,     ICU Day #: 3     Intubation Date: NA     Ventilator Day #: NA     Central Line Insertion Date: NA                                                    Day #: NA      Arterial Line Insertion Date: NA                             Day #: NA     Temporary Hemodialysis Catheter Insertion Date: NA                             Day #: NA     DVT Prophylaxis: Lovenox    GI Prophylaxis: PO Diet     Westfall Catheter Insertion Date: NA                                        Day #: NA                             Continued need (if yes, reason documented and discussed with physician): NA     Skin Issues/ Wounds and ordered treatment discussed on rounds: NA     Goals/ Plans for the Day: Daily labs ,BP control, transfer out of ICU

## 2022-01-17 NOTE — PROGRESS NOTES
[facility] Progress Note    Admitting Date and Time: 1/15/2022 11:27 AM  Admit Dx: Hypertensive emergency [I16.1]    Subjective:  Patient is being followed for Hypertensive emergency [I16.1]     Patient has been weaned off of cardene drip. Does have complaints of dizziness. No vomiting. ROS: denies fever, chills, cp, sob, n/v, HA unless stated above.       amLODIPine  10 mg Oral Daily    lisinopril  10 mg Oral Daily    atorvastatin  40 mg Oral Nightly    nicotine  1 patch TransDERmal Daily    sodium chloride flush  5-40 mL IntraVENous 2 times per day    enoxaparin  40 mg SubCUTAneous Daily     sodium chloride flush, 5-40 mL, PRN  sodium chloride, 25 mL, PRN  ondansetron, 4 mg, Q8H PRN   Or  ondansetron, 4 mg, Q6H PRN  polyethylene glycol, 17 g, Daily PRN  acetaminophen, 650 mg, Q6H PRN   Or  acetaminophen, 650 mg, Q6H PRN  perflutren lipid microspheres, 1.5 mL, ONCE PRN  hydrALAZINE, 10 mg, Q4H PRN         Objective:    BP (!) 172/83   Pulse 70   Temp 98 °F (36.7 °C) (Oral)   Resp 18   Ht 5' 11\" (1.803 m)   Wt 233 lb 4 oz (105.8 kg)   SpO2 98%   BMI 32.53 kg/m²     General Appearance: alert and oriented to person, place and time and in no acute distress  Skin: warm and dry  Head: normocephalic and atraumatic  Eyes: pupils equal, round, and reactive to light, extraocular eye movements intact, conjunctivae normal  Neck: neck supple and non tender without mass   Pulmonary/Chest: clear to auscultation bilaterally- no wheezes, rales or rhonchi, normal air movement, no respiratory distress  Cardiovascular: normal rate, normal S1 and S2 and no carotid bruits  Abdomen: soft, non-tender, non-distended, normal bowel sounds, no masses or organomegaly  Extremities: no cyanosis, no clubbing and no edema  Neurologic: no cranial nerve deficit and speech normal        Recent Labs     01/15/22  1202 01/16/22  0535 01/17/22  0535    137 134   K 3.6 3.3* 4.2    103 103   CO2 22 21* 19* BUN 19 16 26*   CREATININE 1.1 1.0 0.9   GLUCOSE 108* 91 102*   CALCIUM 9.2 9.4 9.3       Recent Labs     01/15/22  1202 01/16/22  0535 01/17/22  0535   WBC 8.4 9.2 9.1   RBC 4.19 4.18 4.22   HGB 14.3 14.3 14.1   HCT 41.1 40.9 41.8   MCV 98.1 97.8 99.1   MCH 34.1 34.2 33.4   MCHC 34.8* 35.0* 33.7   RDW 12.1 12.4 12.4    189 214   MPV 11.4 11.2 11.6       Radiology:   ECHO  Ef 60% with stage II diastolic dysfunction    Assessment:    Active Problems:    Hypertensive emergency  Resolved Problems:    * No resolved hospital problems. *      Plan:    1. Vertigo with nausea due to #2 - PT/OT  2. Hypertensive emergency - Patient is off of cardene drip. Currently on norvasc 10 mg po daily and lisinopril 20 mg po daily. Will need to titrate lisinopril as blood pressures are still elevated. Will discuss with cardiology. Hold AV rohith blocking agents due to mobitz type II. 3. Mobitz type II - Hold AV rohith blocking agents. Normal TSH. Likely due to YINA. Will need outpatient sleep study. 4. DVT prophylaxis - Lovenox    NOTE: This report was transcribed using voice recognition software. Every effort was made to ensure accuracy; however, inadvertent computerized transcription errors may be present.     Electronically signed by Anjali Knight DO on 1/17/2022 at 8:41 AM

## 2022-01-18 ENCOUNTER — APPOINTMENT (OUTPATIENT)
Dept: ULTRASOUND IMAGING | Age: 52
DRG: 305 | End: 2022-01-18
Payer: COMMERCIAL

## 2022-01-18 LAB
ANION GAP SERPL CALCULATED.3IONS-SCNC: 15 MMOL/L (ref 7–16)
BASOPHILS ABSOLUTE: 0.09 E9/L (ref 0–0.2)
BASOPHILS RELATIVE PERCENT: 1.1 % (ref 0–2)
BUN BLDV-MCNC: 29 MG/DL (ref 6–20)
CALCIUM SERPL-MCNC: 9.2 MG/DL (ref 8.6–10.2)
CHLORIDE BLD-SCNC: 103 MMOL/L (ref 98–107)
CO2: 19 MMOL/L (ref 22–29)
CREAT SERPL-MCNC: 1 MG/DL (ref 0.7–1.2)
EOSINOPHILS ABSOLUTE: 0.26 E9/L (ref 0.05–0.5)
EOSINOPHILS RELATIVE PERCENT: 3.1 % (ref 0–6)
GFR AFRICAN AMERICAN: >60
GFR NON-AFRICAN AMERICAN: >60 ML/MIN/1.73
GLUCOSE BLD-MCNC: 95 MG/DL (ref 74–99)
HCT VFR BLD CALC: 40 % (ref 37–54)
HEMOGLOBIN: 13.7 G/DL (ref 12.5–16.5)
IMMATURE GRANULOCYTES #: 0.03 E9/L
IMMATURE GRANULOCYTES %: 0.4 % (ref 0–5)
LYMPHOCYTES ABSOLUTE: 1.64 E9/L (ref 1.5–4)
LYMPHOCYTES RELATIVE PERCENT: 19.8 % (ref 20–42)
MAGNESIUM: 2.2 MG/DL (ref 1.6–2.6)
MCH RBC QN AUTO: 34.1 PG (ref 26–35)
MCHC RBC AUTO-ENTMCNC: 34.3 % (ref 32–34.5)
MCV RBC AUTO: 99.5 FL (ref 80–99.9)
MONOCYTES ABSOLUTE: 0.77 E9/L (ref 0.1–0.95)
MONOCYTES RELATIVE PERCENT: 9.3 % (ref 2–12)
NEUTROPHILS ABSOLUTE: 5.5 E9/L (ref 1.8–7.3)
NEUTROPHILS RELATIVE PERCENT: 66.3 % (ref 43–80)
PDW BLD-RTO: 12.6 FL (ref 11.5–15)
PHOSPHORUS: 4.1 MG/DL (ref 2.5–4.5)
PLATELET # BLD: 180 E9/L (ref 130–450)
PMV BLD AUTO: 11.1 FL (ref 7–12)
POTASSIUM SERPL-SCNC: 3.6 MMOL/L (ref 3.5–5)
RBC # BLD: 4.02 E12/L (ref 3.8–5.8)
SODIUM BLD-SCNC: 137 MMOL/L (ref 132–146)
WBC # BLD: 8.3 E9/L (ref 4.5–11.5)

## 2022-01-18 PROCEDURE — 6370000000 HC RX 637 (ALT 250 FOR IP): Performed by: INTERNAL MEDICINE

## 2022-01-18 PROCEDURE — 76770 US EXAM ABDO BACK WALL COMP: CPT

## 2022-01-18 PROCEDURE — 99233 SBSQ HOSP IP/OBS HIGH 50: CPT | Performed by: INTERNAL MEDICINE

## 2022-01-18 PROCEDURE — 85025 COMPLETE CBC W/AUTO DIFF WBC: CPT

## 2022-01-18 PROCEDURE — 2580000003 HC RX 258: Performed by: INTERNAL MEDICINE

## 2022-01-18 PROCEDURE — 80048 BASIC METABOLIC PNL TOTAL CA: CPT

## 2022-01-18 PROCEDURE — 6360000002 HC RX W HCPCS: Performed by: INTERNAL MEDICINE

## 2022-01-18 PROCEDURE — 83735 ASSAY OF MAGNESIUM: CPT

## 2022-01-18 PROCEDURE — 36415 COLL VENOUS BLD VENIPUNCTURE: CPT

## 2022-01-18 PROCEDURE — 84100 ASSAY OF PHOSPHORUS: CPT

## 2022-01-18 PROCEDURE — 1200000000 HC SEMI PRIVATE

## 2022-01-18 PROCEDURE — 94660 CPAP INITIATION&MGMT: CPT

## 2022-01-18 RX ORDER — HYDRALAZINE HYDROCHLORIDE 50 MG/1
100 TABLET, FILM COATED ORAL 3 TIMES DAILY
Status: DISCONTINUED | OUTPATIENT
Start: 2022-01-18 | End: 2022-01-19 | Stop reason: HOSPADM

## 2022-01-18 RX ORDER — HYDROCHLOROTHIAZIDE 25 MG/1
25 TABLET ORAL DAILY
Status: DISCONTINUED | OUTPATIENT
Start: 2022-01-18 | End: 2022-01-19 | Stop reason: HOSPADM

## 2022-01-18 RX ORDER — CLONIDINE HYDROCHLORIDE 0.1 MG/1
0.1 TABLET ORAL 2 TIMES DAILY
Status: DISCONTINUED | OUTPATIENT
Start: 2022-01-18 | End: 2022-01-19 | Stop reason: HOSPADM

## 2022-01-18 RX ADMIN — HYDRALAZINE HYDROCHLORIDE 10 MG: 20 INJECTION INTRAMUSCULAR; INTRAVENOUS at 18:46

## 2022-01-18 RX ADMIN — CLONIDINE HYDROCHLORIDE 0.1 MG: 0.1 TABLET ORAL at 18:46

## 2022-01-18 RX ADMIN — ENOXAPARIN SODIUM 40 MG: 100 INJECTION SUBCUTANEOUS at 09:00

## 2022-01-18 RX ADMIN — Medication 10 ML: at 09:00

## 2022-01-18 RX ADMIN — ATORVASTATIN CALCIUM 40 MG: 40 TABLET, FILM COATED ORAL at 21:03

## 2022-01-18 RX ADMIN — HYDRALAZINE HYDROCHLORIDE 100 MG: 50 TABLET, FILM COATED ORAL at 21:03

## 2022-01-18 RX ADMIN — ACETAMINOPHEN 650 MG: 325 TABLET ORAL at 21:11

## 2022-01-18 RX ADMIN — Medication 10 ML: at 21:04

## 2022-01-18 RX ADMIN — HYDROCHLOROTHIAZIDE 25 MG: 25 TABLET ORAL at 09:00

## 2022-01-18 RX ADMIN — HYDRALAZINE HYDROCHLORIDE 10 MG: 20 INJECTION INTRAMUSCULAR; INTRAVENOUS at 12:36

## 2022-01-18 RX ADMIN — HYDRALAZINE HYDROCHLORIDE 50 MG: 50 TABLET, FILM COATED ORAL at 09:00

## 2022-01-18 RX ADMIN — AMLODIPINE BESYLATE 10 MG: 10 TABLET ORAL at 09:00

## 2022-01-18 RX ADMIN — HYDRALAZINE HYDROCHLORIDE 100 MG: 50 TABLET, FILM COATED ORAL at 13:51

## 2022-01-18 RX ADMIN — LISINOPRIL 20 MG: 20 TABLET ORAL at 09:00

## 2022-01-18 ASSESSMENT — PAIN SCALES - GENERAL: PAINLEVEL_OUTOF10: 6

## 2022-01-18 NOTE — PROGRESS NOTES
US results read to Dr. Sarah Nair. Discussed HTN management, last reading 180/101. See new order for BID clonidine.

## 2022-01-18 NOTE — PROGRESS NOTES
[facility] Progress Note    Admitting Date and Time: 1/15/2022 11:27 AM  Admit Dx: Hypertensive emergency [I16.1]    Subjective:  Patient is being followed for Hypertensive emergency [I16.1]     Patient denies any complaints. ROS: denies fever, chills, cp, sob, n/v, HA unless stated above.      hydroCHLOROthiazide  25 mg Oral Daily    hydrALAZINE  100 mg Oral TID    lisinopril  20 mg Oral Daily    amLODIPine  10 mg Oral Daily    atorvastatin  40 mg Oral Nightly    nicotine  1 patch TransDERmal Daily    sodium chloride flush  5-40 mL IntraVENous 2 times per day    enoxaparin  40 mg SubCUTAneous Daily     sodium chloride flush, 5-40 mL, PRN  sodium chloride, 25 mL, PRN  ondansetron, 4 mg, Q8H PRN   Or  ondansetron, 4 mg, Q6H PRN  polyethylene glycol, 17 g, Daily PRN  acetaminophen, 650 mg, Q6H PRN   Or  acetaminophen, 650 mg, Q6H PRN  perflutren lipid microspheres, 1.5 mL, ONCE PRN  hydrALAZINE, 10 mg, Q4H PRN         Objective:    BP (!) 187/118   Pulse 72   Temp 98.4 °F (36.9 °C) (Oral)   Resp 16   Ht 5' 11\" (1.803 m)   Wt 233 lb 4 oz (105.8 kg)   SpO2 97%   BMI 32.53 kg/m²     General Appearance: alert and oriented to person, place and time and in no acute distress  Skin: warm and dry  Head: normocephalic and atraumatic  Eyes: pupils equal, round, and reactive to light, extraocular eye movements intact, conjunctivae normal  Neck: neck supple and non tender without mass   Pulmonary/Chest: clear to auscultation bilaterally- no wheezes, rales or rhonchi, normal air movement, no respiratory distress  Cardiovascular: normal rate, normal S1 and S2 and no carotid bruits  Abdomen: soft, non-tender, non-distended, normal bowel sounds, no masses or organomegaly  Extremities: no cyanosis, no clubbing and no edema  Neurologic: no cranial nerve deficit and speech normal        Recent Labs     01/16/22  0535 01/17/22  0535 01/18/22  0418    134 137   K 3.3* 4.2 3.6    103 103 CO2 21* 19* 19*   BUN 16 26* 29*   CREATININE 1.0 0.9 1.0   GLUCOSE 91 102* 95   CALCIUM 9.4 9.3 9.2       Recent Labs     01/16/22  0535 01/17/22  0535 01/18/22  0418   WBC 9.2 9.1 8.3   RBC 4.18 4.22 4.02   HGB 14.3 14.1 13.7   HCT 40.9 41.8 40.0   MCV 97.8 99.1 99.5   MCH 34.2 33.4 34.1   MCHC 35.0* 33.7 34.3   RDW 12.4 12.4 12.6    214 180   MPV 11.2 11.6 11.1       Radiology: None    Assessment:    Active Problems:    Hypertensive emergency  Resolved Problems:    * No resolved hospital problems. *      Plan:       1. Vertigo with nausea due to #2 - PT/OT  2. Hypertensive emergency - Patient is off of cardene drip. Currently on norvasc 10 mg po daily,  lisinopril 20 mg po daily, and hydralazine 50 tid. Blood pressure still elevated. Will increase hydralazine to 100 tid. Will discuss with cardiology. Hold AV rohith blocking agents due to mobitz type II. 3. Mobitz type II - Hold AV rohith blocking agents. Normal TSH. Likely due to YINA. Will need outpatient sleep study. 4. DVT prophylaxis - Lovenox    Tobacco and alcohol cesstion    NOTE: This report was transcribed using voice recognition software. Every effort was made to ensure accuracy; however, inadvertent computerized transcription errors may be present.     Electronically signed by Savana Carver DO on 1/18/2022 at 11:25 AM

## 2022-01-18 NOTE — PROGRESS NOTES
INPATIENT CARDIOLOGY FOLLOW-UP    Name: Bria Arias    Age: 54 y.o. Date of Admission: 1/15/2022 11:27 AM    Date of Service: 1/18/2022    Chief Complaint: Follow-up for hypertensive urgency. Interim History:  No new overnight cardiac complaints. Currently with no complaints of CP, SOB, palpitations, dizziness, or lightheadedness. SR on telemetry.     Review of Systems:   Cardiac: As per HPI  General: No fever, chills  Pulmonary: As per HPI  HEENT: No visual disturbances, difficult swallowing  GI: No nausea, vomiting  Endocrine: No thyroid disease or DM  Musculoskeletal: HAYNES x 4, no focal motor deficits  Skin: Intact, no rashes  Neuro/Psych: No headache or seizures    Problem List:  Patient Active Problem List   Diagnosis    Hypertensive emergency       Allergies:  No Known Allergies    Current Medications:  Current Facility-Administered Medications   Medication Dose Route Frequency Provider Last Rate Last Admin    hydroCHLOROthiazide (HYDRODIURIL) tablet 25 mg  25 mg Oral Daily Terrance Garza MD   25 mg at 01/18/22 0900    hydrALAZINE (APRESOLINE) tablet 100 mg  100 mg Oral TID Faustino Garcia DO   100 mg at 01/18/22 1351    lisinopril (PRINIVIL;ZESTRIL) tablet 20 mg  20 mg Oral Daily Alexa Acuna MD   20 mg at 01/18/22 0900    amLODIPine (NORVASC) tablet 10 mg  10 mg Oral Daily Alexa Acuna MD   10 mg at 01/18/22 0900    atorvastatin (LIPITOR) tablet 40 mg  40 mg Oral Nightly Alexa Acuna MD   40 mg at 01/17/22 2114    nicotine (NICODERM CQ) 7 MG/24HR 1 patch  1 patch TransDERmal Daily Alexa Acuna MD        sodium chloride flush 0.9 % injection 5-40 mL  5-40 mL IntraVENous 2 times per day Alexa Acuna MD   10 mL at 01/18/22 0900    sodium chloride flush 0.9 % injection 5-40 mL  5-40 mL IntraVENous PRN Alexa Acuna MD        0.9 % sodium chloride infusion  25 mL IntraVENous PRN Alexa Acuna MD        enoxaparin (LOVENOX) injection 40 mg  40 mg SubCUTAneous Daily Brenna MACIAS Carmina Coker MD   40 mg at 01/18/22 0900    ondansetron (ZOFRAN-ODT) disintegrating tablet 4 mg  4 mg Oral Q8H PRN Jacy Mata MD        Or    ondansetron [facility]) injection 4 mg  4 mg IntraVENous Q6H PRN Jacy Mata MD        polyethylene glycol (GLYCOLAX) packet 17 g  17 g Oral Daily PRN Jacy Mata MD        acetaminophen (TYLENOL) tablet 650 mg  650 mg Oral Q6H PRN Jacy Mata MD   650 mg at 01/17/22 1257    Or    acetaminophen (TYLENOL) suppository 650 mg  650 mg Rectal Q6H PRN Jacy Mata MD        perflutren lipid microspheres (DEFINITY) injection 1.65 mg  1.5 mL IntraVENous ONCE PRN Jacy Mata MD        hydrALAZINE (APRESOLINE) injection 10 mg  10 mg IntraVENous Q4H PRN Jacy Mata MD   10 mg at 01/18/22 1236      sodium chloride         Physical Exam:  BP (!) 161/95   Pulse 66   Temp 98.8 °F (37.1 °C) (Oral)   Resp 16   Ht 5' 11\" (1.803 m)   Wt 233 lb 4 oz (105.8 kg)   SpO2 97%   BMI 32.53 kg/m²   Wt Readings from Last 3 Encounters:   01/15/22 233 lb 4 oz (105.8 kg)     Appearance: Awake, alert, no acute respiratory distress  Skin: Intact, no rash  Head: Normocephalic, atraumatic  Eyes: EOMI, no conjunctival erythema  ENMT: No pharyngeal erythema, MMM, no rhinorrhea  Neck: Supple, no elevated JVP, no carotid bruits  Lungs: Clear to auscultation bilaterally. No wheezes, rales, or rhonchi. Cardiac: Regular rate and rhythm, +S1S2, no murmurs apparent  Abdomen: Soft, nontender, +bowel sounds  Extremities: Moves all extremities x 4, no lower extremity edema  Neurologic: No focal motor deficits apparent, normal mood and affect  Peripheral Pulses: Intact posterior tibial pulses bilaterally    Intake/Output:  No intake or output data in the 24 hours ending 01/18/22 1809  No intake/output data recorded.     Laboratory Tests:  Recent Labs     01/16/22  0535 01/17/22  0535 01/18/22  0418    134 137   K 3.3* 4.2 3.6    103 103   CO2 21* 19* 19*   BUN 16 26* 29* CREATININE 1.0 0.9 1.0   GLUCOSE 91 102* 95   CALCIUM 9.4 9.3 9.2     Lab Results   Component Value Date    MG 2.2 01/18/2022     No results for input(s): ALKPHOS, ALT, AST, PROT, BILITOT, BILIDIR, LABALBU in the last 72 hours. Recent Labs     01/16/22  0535 01/17/22  0535 01/18/22  0418   WBC 9.2 9.1 8.3   RBC 4.18 4.22 4.02   HGB 14.3 14.1 13.7   HCT 40.9 41.8 40.0   MCV 97.8 99.1 99.5   MCH 34.2 33.4 34.1   MCHC 35.0* 33.7 34.3   RDW 12.4 12.4 12.6    214 180   MPV 11.2 11.6 11.1     No results found for: CKTOTAL, CKMB, CKMBINDEX, TROPONINI  No results found for: INR, PROTIME  Lab Results   Component Value Date    TSH 1.310 01/16/2022     Lab Results   Component Value Date    LABA1C 5.3 01/16/2022     No results found for: EAG  Lab Results   Component Value Date    CHOL 204 (H) 01/16/2022     Lab Results   Component Value Date    TRIG 181 (H) 01/16/2022     Lab Results   Component Value Date    HDL 36 01/16/2022     Lab Results   Component Value Date    LDLCALC 132 (H) 01/16/2022     Lab Results   Component Value Date    LABVLDL 36 01/16/2022     No results found for: [facility]    Cardiac Tests:  Telemetry findings reviewed: SR at rate with heart rate in the 90s with intermittent second-degree Mobitz type II block mostly while sleeping.     Vitals and labs were reviewed: Blood pressure 161/95, heart rate 66, sats 97%     CTA neck with contrast 1/15/2022:   FINDINGS:   AORTIC ARCH/ARCH VESSELS: Motion degraded evaluation of the imaged aorta   origins of the innominate, brachiocephalic, and subclavian arteries appear patent. CAROTID ARTERIES:   Right common carotid artery: Patent without focal stenosis. Right internal carotid artery: No significant stenosis by NASCET criteria. Right external carotid artery origin: Patent without focal stenosis. Left common carotid artery: Patent without focal stenosis. Left internal carotid artery: No significant stenosis by NASCET criteria.    Left external carotid artery origin: Patent without focal stenosis. VERTEBRAL ARTERIES:   Right vertebral artery: Patent without focal stenosis. Left vertebral artery: Patent without focal stenosis. Dominant. SOFT TISSUES: Visualized lung apices are clear.  No adenopathy in the neck and visualized chest. Numerous dental caries and periapical lucencies noted. Prominent adenoids and palatine tonsils. BONES: Scattered degenerative changes noted in the visualized spine without   spondylolisthesis.     CXR 1/15/2022:   FINDINGS:   The cardiac silhouette is within normal limits. The right lung is clear. There is a linear density seen within the left upper lobe which could represent linear atelectasis. Terrance Pott is no left lung infiltrate. [place] Pott is no right or left pleural effusion.      Echo 1/15/2022 (Dr. Gilda Rao):  Summary   Normal left ventricular systolic function.   Ejection fraction is visually estimated at 60%.  Moderate concentric left ventricular hypertrophy.   Dilated right ventricle and normal right ventricular function (TAPSE 2.9  cm).   There is doppler evidence of stage II diastolic dysfunction.   No evidence of hemodynamically significant valve disease.   Unable to estimate PASP due to incomplete tricuspid regurgitation  envelope.           Assessment:  1. Intermittent SB and  2:1 AV block mostly with this during sleep, no AV blocks were noted overnight. · Likely due to  increased vagal tone secondary to probable YINA. · No clinical indication for PPM at this time.    · Normal TSH  2. Hypertensive emergency improved but still blood pressures not well controlled  · Cardene now weaned off   3. Lightheadedness / Dizziness / headache most likely secondary to #2.   4. Moderate LVH with stage I diastolic dysfunction   5. Right BBB  6. Hyperlipidemia  7. ASCVD risk score 19.7%  8. Tobacco abuse: 1 PPD x 30 years  9. Daily ETOH use: 4 to 5 beers daily   10. Obesity: BMI 32.53 kg/m2  11.  Probable YINA     12. Prerenal azotemia            Plan:  · Avoid AV rohith blocking agents  · Continue lisinopril 20 mg p.o. daily and continue amlodipine 10 mg p.o. daily, agree with increasing hydralazine to 100 mg p.o. 3 times a day. Add hydrochlorothiazide 25 mg p.o. daily  · Obtain bilateral renal artery Doppler to rule out renal artery stenosis. Obtain serum renin and aldosterone levels to rule out secondary causes of hypertension  · Continue atorvastatin 40 mg p.o. daily  · We will arrange for a 30-day Holter monitor upon discharge  · If remains stable then he can be transferred to monitored floor  · Check orthostatic blood pressures due to dizziness  · Patient was advised to quit tobacco and alcohol use  · He needs sleep study as an outpatient to rule out obstructive sleep apnea  · Echo results were reviewed.        Caleb Rodriguez MD., Alana Young.   [facility]) Cardiology

## 2022-01-18 NOTE — PROGRESS NOTES
Spoke with pharmacist regarding TID PO and PRN IV hydralazine orders. States OK to use PRN IV dose for breakthrough while following parameters.

## 2022-01-19 ENCOUNTER — APPOINTMENT (OUTPATIENT)
Dept: CT IMAGING | Age: 52
DRG: 305 | End: 2022-01-19
Payer: COMMERCIAL

## 2022-01-19 VITALS
TEMPERATURE: 98.1 F | DIASTOLIC BLOOD PRESSURE: 83 MMHG | SYSTOLIC BLOOD PRESSURE: 141 MMHG | RESPIRATION RATE: 16 BRPM | OXYGEN SATURATION: 100 % | HEART RATE: 64 BPM | BODY MASS INDEX: 32.65 KG/M2 | HEIGHT: 71 IN | WEIGHT: 233.25 LBS

## 2022-01-19 LAB
ANION GAP SERPL CALCULATED.3IONS-SCNC: 15 MMOL/L (ref 7–16)
BASOPHILS ABSOLUTE: 0.07 E9/L (ref 0–0.2)
BASOPHILS RELATIVE PERCENT: 0.8 % (ref 0–2)
BUN BLDV-MCNC: 31 MG/DL (ref 6–20)
CALCIUM SERPL-MCNC: 9.6 MG/DL (ref 8.6–10.2)
CHLORIDE BLD-SCNC: 101 MMOL/L (ref 98–107)
CO2: 20 MMOL/L (ref 22–29)
CREAT SERPL-MCNC: 1.1 MG/DL (ref 0.7–1.2)
CSF CULTURE: NORMAL
EOSINOPHILS ABSOLUTE: 0.26 E9/L (ref 0.05–0.5)
EOSINOPHILS RELATIVE PERCENT: 3.1 % (ref 0–6)
GFR AFRICAN AMERICAN: >60
GFR NON-AFRICAN AMERICAN: >60 ML/MIN/1.73
GLUCOSE BLD-MCNC: 100 MG/DL (ref 74–99)
GRAM STAIN RESULT: NORMAL
HCT VFR BLD CALC: 40.7 % (ref 37–54)
HEMOGLOBIN: 14 G/DL (ref 12.5–16.5)
IMMATURE GRANULOCYTES #: 0.02 E9/L
IMMATURE GRANULOCYTES %: 0.2 % (ref 0–5)
LYMPHOCYTES ABSOLUTE: 1.48 E9/L (ref 1.5–4)
LYMPHOCYTES RELATIVE PERCENT: 17.9 % (ref 20–42)
MAGNESIUM: 2.3 MG/DL (ref 1.6–2.6)
MCH RBC QN AUTO: 34.2 PG (ref 26–35)
MCHC RBC AUTO-ENTMCNC: 34.4 % (ref 32–34.5)
MCV RBC AUTO: 99.5 FL (ref 80–99.9)
MONOCYTES ABSOLUTE: 0.78 E9/L (ref 0.1–0.95)
MONOCYTES RELATIVE PERCENT: 9.4 % (ref 2–12)
NEUTROPHILS ABSOLUTE: 5.67 E9/L (ref 1.8–7.3)
NEUTROPHILS RELATIVE PERCENT: 68.6 % (ref 43–80)
PDW BLD-RTO: 12.5 FL (ref 11.5–15)
PHOSPHORUS: 4.7 MG/DL (ref 2.5–4.5)
PLATELET # BLD: 192 E9/L (ref 130–450)
PMV BLD AUTO: 10.8 FL (ref 7–12)
POTASSIUM SERPL-SCNC: 3.7 MMOL/L (ref 3.5–5)
RBC # BLD: 4.09 E12/L (ref 3.8–5.8)
SODIUM BLD-SCNC: 136 MMOL/L (ref 132–146)
WBC # BLD: 8.3 E9/L (ref 4.5–11.5)

## 2022-01-19 PROCEDURE — 94660 CPAP INITIATION&MGMT: CPT

## 2022-01-19 PROCEDURE — 84100 ASSAY OF PHOSPHORUS: CPT

## 2022-01-19 PROCEDURE — 99239 HOSP IP/OBS DSCHRG MGMT >30: CPT | Performed by: INTERNAL MEDICINE

## 2022-01-19 PROCEDURE — 6370000000 HC RX 637 (ALT 250 FOR IP): Performed by: INTERNAL MEDICINE

## 2022-01-19 PROCEDURE — 2580000003 HC RX 258: Performed by: RADIOLOGY

## 2022-01-19 PROCEDURE — 2580000003 HC RX 258: Performed by: INTERNAL MEDICINE

## 2022-01-19 PROCEDURE — 36415 COLL VENOUS BLD VENIPUNCTURE: CPT

## 2022-01-19 PROCEDURE — 80048 BASIC METABOLIC PNL TOTAL CA: CPT

## 2022-01-19 PROCEDURE — 6360000004 HC RX CONTRAST MEDICATION: Performed by: RADIOLOGY

## 2022-01-19 PROCEDURE — 74174 CTA ABD&PLVS W/CONTRAST: CPT

## 2022-01-19 PROCEDURE — 6360000002 HC RX W HCPCS: Performed by: INTERNAL MEDICINE

## 2022-01-19 PROCEDURE — 85025 COMPLETE CBC W/AUTO DIFF WBC: CPT

## 2022-01-19 PROCEDURE — 99232 SBSQ HOSP IP/OBS MODERATE 35: CPT | Performed by: INTERNAL MEDICINE

## 2022-01-19 PROCEDURE — 82088 ASSAY OF ALDOSTERONE: CPT

## 2022-01-19 PROCEDURE — 83735 ASSAY OF MAGNESIUM: CPT

## 2022-01-19 PROCEDURE — 84244 ASSAY OF RENIN: CPT

## 2022-01-19 RX ORDER — SODIUM CHLORIDE 0.9 % (FLUSH) 0.9 %
10 SYRINGE (ML) INJECTION PRN
Status: COMPLETED | OUTPATIENT
Start: 2022-01-19 | End: 2022-01-19

## 2022-01-19 RX ORDER — CLONIDINE HYDROCHLORIDE 0.1 MG/1
0.1 TABLET ORAL 2 TIMES DAILY
Qty: 60 TABLET | Refills: 3 | Status: SHIPPED | OUTPATIENT
Start: 2022-01-19

## 2022-01-19 RX ORDER — ATORVASTATIN CALCIUM 40 MG/1
40 TABLET, FILM COATED ORAL NIGHTLY
Qty: 30 TABLET | Refills: 3 | Status: SHIPPED | OUTPATIENT
Start: 2022-01-19

## 2022-01-19 RX ORDER — LISINOPRIL 20 MG/1
20 TABLET ORAL DAILY
Qty: 30 TABLET | Refills: 3 | Status: SHIPPED | OUTPATIENT
Start: 2022-01-20 | End: 2022-05-10

## 2022-01-19 RX ORDER — AMLODIPINE BESYLATE 10 MG/1
10 TABLET ORAL DAILY
Qty: 30 TABLET | Refills: 3 | Status: SHIPPED | OUTPATIENT
Start: 2022-01-20 | End: 2022-05-10

## 2022-01-19 RX ORDER — HYDROCHLOROTHIAZIDE 25 MG/1
25 TABLET ORAL DAILY
Qty: 30 TABLET | Refills: 3 | Status: SHIPPED | OUTPATIENT
Start: 2022-01-20 | End: 2022-05-10

## 2022-01-19 RX ORDER — HYDRALAZINE HYDROCHLORIDE 100 MG/1
100 TABLET, FILM COATED ORAL 3 TIMES DAILY
Qty: 90 TABLET | Refills: 3 | Status: SHIPPED | OUTPATIENT
Start: 2022-01-19

## 2022-01-19 RX ADMIN — CLONIDINE HYDROCHLORIDE 0.1 MG: 0.1 TABLET ORAL at 20:22

## 2022-01-19 RX ADMIN — ENOXAPARIN SODIUM 40 MG: 100 INJECTION SUBCUTANEOUS at 07:56

## 2022-01-19 RX ADMIN — LISINOPRIL 20 MG: 20 TABLET ORAL at 07:56

## 2022-01-19 RX ADMIN — AMLODIPINE BESYLATE 10 MG: 10 TABLET ORAL at 07:56

## 2022-01-19 RX ADMIN — IOPAMIDOL 75 ML: 755 INJECTION, SOLUTION INTRAVENOUS at 17:55

## 2022-01-19 RX ADMIN — HYDROCHLOROTHIAZIDE 25 MG: 25 TABLET ORAL at 07:56

## 2022-01-19 RX ADMIN — HYDRALAZINE HYDROCHLORIDE 100 MG: 50 TABLET, FILM COATED ORAL at 13:28

## 2022-01-19 RX ADMIN — HYDRALAZINE HYDROCHLORIDE 10 MG: 20 INJECTION INTRAMUSCULAR; INTRAVENOUS at 01:35

## 2022-01-19 RX ADMIN — SODIUM CHLORIDE, PRESERVATIVE FREE 10 ML: 5 INJECTION INTRAVENOUS at 17:53

## 2022-01-19 RX ADMIN — CLONIDINE HYDROCHLORIDE 0.1 MG: 0.1 TABLET ORAL at 07:56

## 2022-01-19 RX ADMIN — ATORVASTATIN CALCIUM 40 MG: 40 TABLET, FILM COATED ORAL at 20:22

## 2022-01-19 RX ADMIN — HYDRALAZINE HYDROCHLORIDE 100 MG: 50 TABLET, FILM COATED ORAL at 07:56

## 2022-01-19 RX ADMIN — Medication 10 ML: at 13:18

## 2022-01-19 RX ADMIN — HYDRALAZINE HYDROCHLORIDE 100 MG: 50 TABLET, FILM COATED ORAL at 20:22

## 2022-01-19 NOTE — PLAN OF CARE
Problem: Pain:  Goal: Pain level will decrease  Description: Pain level will decrease  Outcome: Met This Shift  Goal: Control of chronic pain  Description: Control of chronic pain  Outcome: Met This Shift     Problem: Pain:  Goal: Pain level will decrease  Description: Pain level will decrease  Outcome: Met This Shift  Goal: Recognizes and communicates pain  Description: Recognizes and communicates pain  Outcome: Met This Shift  Goal: Control of chronic pain  Description: Control of chronic pain  Outcome: Met This Shift     Problem: Falls - Risk of:  Goal: Will remain free from falls  Description: Will remain free from falls  Outcome: Met This Shift  Goal: Absence of physical injury  Description: Absence of physical injury  Outcome: Met This Shift     Problem: Pain:  Goal: Control of acute pain  Description: Control of acute pain  Outcome: Ongoing     Problem: Discharge Planning:  Goal: Participates in care planning  Description: Participates in care planning  Outcome: Ongoing  Goal: Discharged to appropriate level of care  Description: Discharged to appropriate level of care  Outcome: Ongoing     Problem: Anxiety/Stress:  Goal: Level of anxiety will decrease  Description: Level of anxiety will decrease  Outcome: Ongoing     Problem:  Bowel Function - Altered:  Goal: Bowel elimination is within specified parameters  Description: Bowel elimination is within specified parameters  Outcome: Ongoing     Problem: Cardiac Output - Decreased:  Goal: Hemodynamic stability will improve  Description: Hemodynamic stability will improve  Outcome: Ongoing     Problem: Fluid Volume - Imbalance:  Goal: Absence of imbalanced fluid volume signs and symptoms  Description: Absence of imbalanced fluid volume signs and symptoms  Outcome: Ongoing     Problem: Pain:  Goal: Control of acute pain  Description: Control of acute pain  Outcome: Ongoing     Problem: Sleep Pattern Disturbance:  Goal: Appears well-rested  Description: Appears well-rested  Outcome: Ongoing     Problem: Tissue Perfusion, Altered:  Goal: Circulatory function within specified parameters  Description: Circulatory function within specified parameters  Outcome: Ongoing     Problem: Tissue Perfusion - Cardiopulmonary, Altered:  Goal: Absence of angina  Description: Absence of angina  Outcome: Ongoing  Goal: Hemodynamic stability will improve  Description: Hemodynamic stability will improve  Outcome: Ongoing

## 2022-01-19 NOTE — PROGRESS NOTES
Comprehensive Nutrition Assessment    Type and Reason for Visit:  Initial,Positive Nutrition Screen    Nutrition Recommendations/Plan: Continue Diet. Will Start ONS and monitor. Nutrition Assessment:  Pt adm w/ nausea/diarrhea, abd pain and dizzyness w/ H. A. x ~1-2wks pta. PMHx ETOH abuse, recent COVID19+ (~2wks ago); adm w/ vertigo 2/2 hypertensive emergency and DVT prophylaxis. At risk d/t noted subjective poor intake x ~1-2wks pta d/t poor appetite w/ altered GI 2/2 COVID19+ w/ ETOH abuse. Will Start ONS and monitor. Malnutrition Assessment:  Malnutrition Status: At risk for malnutrition (Comment)    Context:  Acute Illness     Findings of the 6 clinical characteristics of malnutrition:  Energy Intake:  1 - 75% or less of estimated energy requirements for 7 or more days  Weight Loss:  Unable to assess     Body Fat Loss:  No significant body fat loss     Muscle Mass Loss:  No significant muscle mass loss    Fluid Accumulation:  No significant fluid accumulation     Strength:  Not Performed    Estimated Daily Nutrient Needs:  Energy (kcal):  7555-6553; Weight Used for Energy Requirements:  Current     Protein (g):  1.3-1.5gm/kg IBW= 100-115; Weight Used for Protein Requirements:  Ideal        Fluid (ml/day):  4112-7009; Method Used for Fluid Requirements:  1 ml/kcal      Nutrition Related Findings:  A&O, dentition WNL, Abd WDL, hypo BS, no edema, no noted I/O's currently      Wounds:  None       Current Nutrition Therapies:    ADULT DIET; Regular; No Added Salt (3-4 gm)    Anthropometric Measures:  · Height: 5' 11\" (180.3 cm)  · Current Body Weight: 233 lb (105.7 kg) (actual 1/15)   · Admission Body Weight: 233 lb (105.7 kg) (actual 1/15)    · Usual Body Weight:  (UTO UBW 2/2 poor EMR wt hx pta)     · Ideal Body Weight: 172 lbs; % Ideal Body Weight     · BMI: 32.5  · Adjusted Body Weight:  ; No Adjustment   · Adjusted BMI:      · BMI Categories: Obese Class 1 (BMI 30.0-34. 9)       Nutrition Diagnosis:   · Inadequate oral intake related to altered GI function (2/2 h/o COVID19+ w/ ETOH abuse) as evidenced by poor intake prior to Professor Maria G Musa      Nutrition Interventions:   Food and/or Nutrient Delivery:  Continue Current Diet,Start Oral Nutrition Supplement (Continue Diet. Will Start ONS and monitor.)  Nutrition Education/Counseling:  Education not indicated   Coordination of Nutrition Care:  Continue to monitor while inpatient    Goals:  PO intake >75% of meals/ONS.        Nutrition Monitoring and Evaluation:   Behavioral-Environmental Outcomes:  None Identified   Food/Nutrient Intake Outcomes:  Supplement Intake,Food and Nutrient Intake  Physical Signs/Symptoms Outcomes:  Biochemical Data,Diarrhea,GI Status,Nausea or Vomiting,Fluid Status or Edema,Nutrition Focused Physical Findings,Skin,Weight     Discharge Planning:    No discharge needs at this time     Electronically signed by Norma Melo RD, LD on 1/19/22 at 12:35 PM EST    Contact: ext [phone]

## 2022-01-19 NOTE — PROGRESS NOTES
INPATIENT CARDIOLOGY FOLLOW-UP    Name: Jose F Segovia    Age: 54 y.o. Date of Admission: 1/15/2022 11:27 AM    Date of Service: 1/19/2022    Chief Complaint: Follow-up for hypertensive urgency. Interim History:  No new overnight cardiac complaints. Still having mild dizziness but significantly better compared to before. Currently with no complaints of CP, SOB, palpitations, dizziness, or lightheadedness. SR on telemetry.     Review of Systems:   Cardiac: As per HPI  General: No fever, chills  Pulmonary: As per HPI  HEENT: No visual disturbances, difficult swallowing  GI: No nausea, vomiting  Endocrine: No thyroid disease or DM  Musculoskeletal: HAYNES x 4, no focal motor deficits  Skin: Intact, no rashes  Neuro/Psych: No headache or seizures    Problem List:  Patient Active Problem List   Diagnosis    Hypertensive emergency       Allergies:  No Known Allergies    Current Medications:  Current Facility-Administered Medications   Medication Dose Route Frequency Provider Last Rate Last Admin    hydroCHLOROthiazide (HYDRODIURIL) tablet 25 mg  25 mg Oral Daily Anu Nair MD   25 mg at 01/19/22 0756    hydrALAZINE (APRESOLINE) tablet 100 mg  100 mg Oral TID Berenda [place] Ruth, DO   100 mg at 01/19/22 0756    cloNIDine (CATAPRES) tablet 0.1 mg  0.1 mg Oral BID Anu Nair MD   0.1 mg at 01/19/22 0756    lisinopril (PRINIVIL;ZESTRIL) tablet 20 mg  20 mg Oral Daily Jared Wahl MD   20 mg at 01/19/22 0756    amLODIPine (NORVASC) tablet 10 mg  10 mg Oral Daily Jared Wahl MD   10 mg at 01/19/22 0756    atorvastatin (LIPITOR) tablet 40 mg  40 mg Oral Nightly Jared Wahl MD   40 mg at 01/18/22 2103    nicotine (NICODERM CQ) 7 MG/24HR 1 patch  1 patch TransDERmal Daily Jared Wahl MD        sodium chloride flush 0.9 % injection 5-40 mL  5-40 mL IntraVENous 2 times per day Jared Wahl MD   10 mL at 01/18/22 2104    sodium chloride flush 0.9 % injection 5-40 mL  5-40 mL IntraVENous PRN Brenna Michelle Delcid MD        0.9 % sodium chloride infusion  25 mL IntraVENous PRN Juana Harper MD        enoxaparin (LOVENOX) injection 40 mg  40 mg SubCUTAneous Daily Juana Harper MD   40 mg at 01/19/22 0756    ondansetron (ZOFRAN-ODT) disintegrating tablet 4 mg  4 mg Oral Q8H PRN Juana Harper MD        Or    ondansetron [facility]) injection 4 mg  4 mg IntraVENous Q6H PRN Juana Harper MD        polyethylene glycol (GLYCOLAX) packet 17 g  17 g Oral Daily PRN Juana Harper MD        acetaminophen (TYLENOL) tablet 650 mg  650 mg Oral Q6H PRN Juana Harper MD   650 mg at 01/18/22 2111    Or    acetaminophen (TYLENOL) suppository 650 mg  650 mg Rectal Q6H PRN Juana Harper MD        perflutren lipid microspheres (DEFINITY) injection 1.65 mg  1.5 mL IntraVENous ONCE PRN Juana Harper MD        hydrALAZINE (APRESOLINE) injection 10 mg  10 mg IntraVENous Q4H PRN Juana Harper MD   10 mg at 01/19/22 0135      sodium chloride         Physical Exam:  /80   Pulse 73   Temp 97.5 °F (36.4 °C) (Oral)   Resp 16   Ht 5' 11\" (1.803 m)   Wt 233 lb 4 oz (105.8 kg)   SpO2 100%   BMI 32.53 kg/m²   Wt Readings from Last 3 Encounters:   01/15/22 233 lb 4 oz (105.8 kg)     Appearance: Awake, alert, no acute respiratory distress  Skin: Intact, no rash  Head: Normocephalic, atraumatic  Eyes: EOMI, no conjunctival erythema  ENMT: No pharyngeal erythema, MMM, no rhinorrhea  Neck: Supple, no elevated JVP, no carotid bruits  Lungs: Clear to auscultation bilaterally. No wheezes, rales, or rhonchi.   Cardiac: Regular rate and rhythm, +S1S2, no murmurs apparent  Abdomen: Soft, nontender, +bowel sounds  Extremities: Moves all extremities x 4, no lower extremity edema  Neurologic: No focal motor deficits apparent, normal mood and affect  Peripheral Pulses: Intact posterior tibial pulses bilaterally    Intake/Output:  No intake or output data in the 24 hours ending 01/19/22 1218  No intake/output data Patent without focal stenosis. Left common carotid artery: Patent without focal stenosis. Left internal carotid artery: No significant stenosis by NASCET criteria. Left external carotid artery origin: Patent without focal stenosis. VERTEBRAL ARTERIES:   Right vertebral artery: Patent without focal stenosis. Left vertebral artery: Patent without focal stenosis. Dominant. SOFT TISSUES: Visualized lung apices are clear.  No adenopathy in the neck and visualized chest. Numerous dental caries and periapical lucencies noted. Prominent adenoids and palatine tonsils. BONES: Scattered degenerative changes noted in the visualized spine without   spondylolisthesis.     CXR 1/15/2022:   FINDINGS:   The cardiac silhouette is within normal limits. The right lung is clear. There is a linear density seen within the left upper lobe which could represent linear atelectasis. Delaney Mettle is no left lung infiltrate. Delaney Mettle is no right or left pleural effusion.      Echo 1/15/2022 (Dr. Diana Barnes):  Summary   Normal left ventricular systolic function.   Ejection fraction is visually estimated at 60%.  Moderate concentric left ventricular hypertrophy.   Dilated right ventricle and normal right ventricular function (TAPSE 2.9  cm).   There is doppler evidence of stage II diastolic dysfunction.   No evidence of hemodynamically significant valve disease.   Unable to estimate PASP due to incomplete tricuspid regurgitation  envelope.     Renal artery Dopplers:  1.  Bilateral renal artery stenosis.  Peak velocity in the right renal artery   was 295 centimeters/second and peak velocity in the left renal artery was 269   centimeters/second.  RAR on the right was 2.8 and RAR on the left was 2.6.       2.  Both renal veins appear patent.  Otherwise normal appearance of the   kidneys.  No hydronephrosis.  Normal appearance of the imaged bladder.       RECOMMENDATIONS:   Recommend screening CTA of the bilateral renal arteries.    Assessment:  1. Intermittent SB and  2:1 AV block mostly with this during sleep, no AV blocks were noted overnight. Further episodes of AV blocks over the last 24 hours. · Likely due to  increased vagal tone secondary to probable YINA. · No clinical indication for PPM at this time.    · Normal TSH  2. Hypertensive emergency improved, now blood pressures are well controlled this afternoon. · Cardene now weaned off   3. Lightheadedness / Dizziness / headache most likely secondary to #2.   4. Moderate LVH with stage I diastolic dysfunction   5. Right BBB  6. Hyperlipidemia  7. ASCVD risk score 19.7%  8. Tobacco abuse: 1 PPD x 30 years  9. Daily ETOH use: 4 to 5 beers daily   10. Obesity: BMI 32.53 kg/m2  11. Probable YINA     12. Prerenal azotemia   13. Bilateral renal artery stenosis based on arterial Dopplers, awaiting CTA of the renal arteries           Plan:  · Agree with CTA of the renal arteries to rule out significant stenosis. · Continue lisinopril 20 mg p.o. daily, amlodipine 10 mg p.o. daily,  hydralazine 100 mg p.o. 3 times a day, hydrochlorothiazide 25 mg p.o. daily and clonidine 0.1 mg p.o. twice daily  · Renal artery Dopplers were reviewed and patient is scheduled for CTA of the renal arteries. · Continue atorvastatin 40 mg p.o. daily  · Needs 30-day monitor upon discharge 30-day Holter monitor upon discharge  · Patient was advised to quit tobacco and alcohol use  · He needs sleep study as an outpatient to rule out obstructive sleep apnea  · Echo results were reviewed. · If the renal artery CT angiogram comes back no significant stenosis then he is stable for discharge from cardiology standpoint. Follow-up with Dr. Bryant Cruz in 1 month.  aZne Price MD., Alecia Vee.   [facility]) Cardiology

## 2022-01-19 NOTE — CARE COORDINATION
Updated plan of care. Continue to monitor blood pressure, med changes. Renal us done on 1/18. Pt plans on ordering blood pressure machine for home. Will to use meds to beds. Pt still wants to make his own PCP appt.  Will continue to follow-mjo

## 2022-01-20 ENCOUNTER — TELEPHONE (OUTPATIENT)
Dept: CARDIOLOGY CLINIC | Age: 52
End: 2022-01-20

## 2022-01-20 NOTE — TELEPHONE ENCOUNTER
Per Dr. Stephanie Mahmood note from 1/19/22, patient needs F/U in (1) month with Dr. Mariluz Gresham.  Patient notified of Dr. Garcia's recommendation. F/U scheduled for 2/22/22 at 9:45 a.m.

## 2022-01-20 NOTE — TELEPHONE ENCOUNTER
Pt admitted to [facility] 1/15-1/19 consulted by REBA. Pt stated he is to FU in 2wks. Please return call at your first convenience.  Thank you

## 2022-01-20 NOTE — DISCHARGE SUMMARY
Patient provided with AVS and RN went through AVS with patient and his wife. All questions answered at that time and patient encouraged to call with any follow up questions. Patient's two peripheral IVs and telemetry leads removed. Patient belongings returned to patient - eyeglasses, clothing, hat, wallet, and cell phone. RN walked patient down to main entrance where wife picked him up.

## 2022-01-22 LAB — ALDOSTERONE: <3 NG/DL

## 2022-01-23 LAB — RENIN ACTIVITY: 7.3 NG/ML/HR

## 2022-01-26 NOTE — DISCHARGE SUMMARY
[facility] Physician Discharge Summary       [id number] [address]  [phone]  Schedule an appointment as soon as possible for a visit  call and make appointment to establish a physician    Trevor Campoverde MD  [address] [place] [address] Jeremiah Avila [address] [place] [address]  [phone]    Schedule an appointment as soon as possible for a visit in 2 weeks        Activity level: As tolerated     Dispo: Home      Condition on discharge: Stable     Patient ID:  Mickey Page  [id number]  28 y.o.  1970    Admit date: 1/15/2022    Discharge date and time:  1/26/2022  2:17 PM    Admission Diagnoses: Active Problems:    Hypertensive emergency  Resolved Problems:    * No resolved hospital problems. *      Discharge Diagnoses: Active Problems:    Hypertensive emergency  Resolved Problems:    * No resolved hospital problems. *      Consults:  IP CONSULT TO CRITICAL CARE  IP CONSULT TO CARDIOLOGY    Procedures: None    Hospital Course:   Patient Mickey Page is a 54 y.o. presented with Hypertensive emergency [I121]    54year old male presented to the hospital with elevated blood pressure. Patient was admitted to the ICU and was started on cardene drip. Cardiology was consulted. He was eventually weaned off of cardene drip and started on lisinopril, norvasc, hydralazine, HCTZ, and clonidine. CTA of the renal arteries done which show now significant LISHA. He is medically cleared by cardiology for discharge with outpatient follow up.     Discharge Exam:    General Appearance: alert and oriented to person, place and time and in no acute distress  Skin: warm and dry  Head: normocephalic and atraumatic  Eyes: pupils equal, round, and reactive to light, extraocular eye movements intact, conjunctivae normal  Neck: neck supple and non tender without mass   Pulmonary/Chest: clear to auscultation bilaterally- no wheezes, rales or rhonchi, normal air movement, no respiratory distress  Cardiovascular: cm).   Left Atrium  Mildly dilated left atrium by volume index. Right Atrium  Mildly dilated right atrium. Mitral Valve  Physiologic and/or trace mitral regurgitation. No evidence of hemodynamically significant mitral stenosis. Tricuspid Valve  Physiologic and/or trace tricuspid regurgitation. Unable to estimate PASP due to incomplete tricuspid regurgitation  envelope. Aortic Valve  No evidence of hemodynamically significant aortic regurgitation or  stenosis. Pulmonic Valve  The pulmonic valve was not well visualized. Pericardial Effusion  There is a small pericardial effusion. Aorta  Aortic root dimension within normal limits. The inferior vena cava is normal in size with normal respiratory  variation. Conclusions   Summary  Normal left ventricular systolic function. Ejection fraction is visually estimated at 60%. Moderate concentric left ventricular hypertrophy. Dilated right ventricle and normal right ventricular function (TAPSE 2.9  cm). There is doppler evidence of stage II diastolic dysfunction. No evidence of hemodynamically significant valve disease. Unable to estimate PASP due to incomplete tricuspid regurgitation  envelope.    Signature   ----------------------------------------------------------------  Electronically signed by Lucretia Dutta MD(Interpreting  physician) on 01/16/2022 01:00 PM  ----------------------------------------------------------------  M-Mode/2D Measurements & Calculations   LV Diastolic    LV Systolic Dimension: 3.6   AV Cusp Separation: 2.1 cmLA  Dimension: 5.2  cm                           Dimension: 4.6 cmAO Root  cm              LV Volume Diastolic: 289.7   Dimension: 3.1 cm  LV FS:30.8 %    ml  LV PW           LV Volume Systolic: 67.9 ml  Diastolic: 1.5  LV EDV/LV EDV Index: 127.5  cm              ml/57 ml/m^2LV ESV/LV ESV    RV Diastolic Dimension: 3.4  LV PW Systolic: Index: 41.6 NH/05CD/ m^2     cm  1.7 cm          EF Calculated: 57.3 %  Septum LV Mass Index: 167 l/min*m^2 LA/Aorta: 8.96  Diastolic: 1.7  LV Length: 8.4 cm            Ascending Aorta: 3.3 cm  cm                                           LA volume/Index: 73.2 ml  Septum          LVOT: 2.4 cm                 /11.68OV/Z^2  Systolic: 1.7                                RA Area: 21.3 cm^2  cm  CO: 8.65 l/min                               IVC Expiration: 1.6 cm  CI: 3.84  l/m*m^2  LV Mass: 376.74  g  Doppler Measurements & Calculations   MV Peak E-Wave: 0.74  AV Peak Velocity: 1.78 LVOT Peak Velocity: 1.38 m/s  m/s                   m/s                    LVOT Mean Velocity: 1.07 m/s  MV Peak A-Wave: 0.86  AV Peak Gradient:      LVOT Peak Gradient: 7.7  m/s                   12.67 mmHg             mmHgLVOT Mean Gradient: 5  MV E/A Ratio: 0.87    AV Mean Velocity: 1.25 mmHg  MV Peak Gradient: 3   m/s  mmHg                  AV Mean Gradient: 7.1  MV Mean Gradient: 1.4 mmHg  mmHg                  AV VTI: 29.9 cm  MV Mean Velocity:     AV Area  0.56 m/s              (Continuity):4.39 cm^2 PV Peak Velocity: 1.36 m/s  MV Deceleration Time:                        PV Peak Gradient: 7.44 mmHg  305.4 msec            LVOT VTI: 29 cm        PV Mean Velocity: 0.86 m/s  MV P1/2t: 114 msec    IVRT: 170.7 msec       PV Mean Gradient: 3.6 mmHg  MVA by PHT:1.93 cm^2  MV Area (continuity):  4.4 cm^2  MV E' Septal  Velocity: 0.05 m/s  MV E' Lateral  Velocity: 3 m/s  [email or web address] [id number]    CT Head WO Contrast    Result Date: 1/15/2022  EXAMINATION: CT OF THE HEAD WITHOUT CONTRAST  1/15/2022 12:03 pm TECHNIQUE: CT of the head was performed without the administration of intravenous contrast. Dose modulation, iterative reconstruction, and/or weight based adjustment of the mA/kV was utilized to reduce the radiation dose to as low as reasonably achievable. COMPARISON: None.  HISTORY: ORDERING SYSTEM PROVIDED HISTORY: dizzy TECHNOLOGIST PROVIDED HISTORY: Reason for exam:->dizzy Has a \"code stroke\" or \"stroke alert\" been called? ->No Decision Support Exception - unselect if not a suspected or confirmed emergency medical condition->Emergency Medical Condition (MA) FINDINGS: BRAIN/VENTRICLES: There is no acute intracranial hemorrhage, mass effect or midline shift. No abnormal extra-axial fluid collection. The gray-white differentiation is maintained without evidence of an acute infarct. There is no evidence of hydrocephalus. ORBITS: The visualized portion of the orbits demonstrate no acute abnormality. SINUSES: There is mucosal thickening seen within the left frontal sinus, ethmoid air cells and within the maxillary sinuses. SOFT TISSUES/SKULL:  No acute abnormality of the visualized skull or soft tissues. No acute intracranial abnormality. Specifically, there is no acute intracranial hemorrhage     CT ABDOMEN PELVIS W IV CONTRAST Additional Contrast? None    Result Date: 1/15/2022  EXAMINATION: CT OF THE ABDOMEN AND PELVIS WITH CONTRAST 1/15/2022 4:58 pm TECHNIQUE: CT of the abdomen and pelvis was performed with the administration of intravenous contrast. Multiplanar reformatted images are provided for review. Dose modulation, iterative reconstruction, and/or weight based adjustment of the mA/kV was utilized to reduce the radiation dose to as low as reasonably achievable. COMPARISON: None. HISTORY: ORDERING SYSTEM PROVIDED HISTORY: abdominal pain TECHNOLOGIST PROVIDED HISTORY: Additional Contrast?->None Reason for exam:->abdominal pain Decision Support Exception - unselect if not a suspected or confirmed emergency medical condition->Emergency Medical Condition (MA) FINDINGS: Lower Chest: Visualized lungs are unremarkable. Cardiomegaly. Organs: The liver a, spleen, adrenal glands, pancreas, gallbladder and kidneys are normal. GI/Bowel: Normal large and small bowel. Pelvis: Normal urinary bladder. Peritoneum/Retroperitoneum: No free fluid or free air. Bones/Soft Tissues: Small fat containing umbilical hernia. Degenerative changes a lumbar spine. No definitive findings to explain the patient's abdominal pain. XR CHEST PORTABLE    Result Date: 1/15/2022  EXAMINATION: ONE XRAY VIEW OF THE CHEST 1/15/2022 12:13 pm COMPARISON: None. HISTORY: ORDERING SYSTEM PROVIDED HISTORY: htn TECHNOLOGIST PROVIDED HISTORY: Reason for exam:->htn FINDINGS: The cardiac silhouette is within normal limits. The right lung is clear. There is a linear density seen within the left upper lobe which could represent linear atelectasis. There is no left lung infiltrate. There is no right or left pleural effusion. 1. There are no findings of pneumonia or failure. CTA NECK W CONTRAST    Result Date: 1/15/2022  EXAMINATION: CTA OF THE NECK 1/15/2022 4:58 pm TECHNIQUE: CTA of the neck was performed with the administration of intravenous contrast. Multiplanar reformatted images are provided for review. MIP images are provided for review. Stenosis of the internal carotid arteries measured using NASCET criteria. Dose modulation, iterative reconstruction, and/or weight based adjustment of the mA/kV was utilized to reduce the radiation dose to as low as reasonably achievable. COMPARISON: None. HISTORY: ORDERING SYSTEM PROVIDED HISTORY: headache TECHNOLOGIST PROVIDED HISTORY: Reason for exam:->headache Has a \"code stroke\" or \"stroke alert\" been called? ->Yes Decision Support Exception - unselect if not a suspected or confirmed emergency medical condition->Emergency Medical Condition (MA) FINDINGS: AORTIC ARCH/ARCH VESSELS: Motion degraded evaluation of the imaged aorta origins of the innominate, brachiocephalic, and subclavian arteries appear patent. CAROTID ARTERIES: Right common carotid artery: Patent without focal stenosis. Right internal carotid artery: No significant stenosis by NASCET criteria.  Right external carotid artery origin: Patent without focal stenosis. Left common carotid artery: Patent without focal stenosis. Left internal carotid artery: No significant stenosis by NASCET criteria. Left external carotid artery origin: Patent without focal stenosis. VERTEBRAL ARTERIES: Right vertebral artery: Patent without focal stenosis. Left vertebral artery: Patent without focal stenosis. Dominant. SOFT TISSUES: Visualized lung apices are clear. No adenopathy in the neck and visualized chest. Numerous dental caries and periapical lucencies noted. Prominent adenoids and palatine tonsils. BONES: Scattered degenerative changes noted in the visualized spine without spondylolisthesis. 1. No significant stenoses of the internal carotid arteries. 2. Other findings as described. CTA HEAD W CONTRAST    Result Date: 1/15/2022  EXAMINATION: CTA OF THE HEAD WITH CONTRAST 1/15/2022 4:58 pm: TECHNIQUE: CTA of the head/brain was performed with the administration of intravenous contrast. Multiplanar reformatted images are provided for review. MIP images are provided for review. Dose modulation, iterative reconstruction, and/or weight based adjustment of the mA/kV was utilized to reduce the radiation dose to as low as reasonably achievable. COMPARISON: CT head same day. HISTORY: ORDERING SYSTEM PROVIDED HISTORY: headache TECHNOLOGIST PROVIDED HISTORY: Reason for exam:->headache Has a \"code stroke\" or \"stroke alert\" been called? ->Yes Decision Support Exception - unselect if not a suspected or confirmed emergency medical condition->Emergency Medical Condition (MA) FINDINGS: BRAIN/VENTRICLES: No acute hemorrhage. Abena Pee white differentiation appears maintained given artifact near the skull base and through the posterior fossa. Artifact partially obscures the jean. Hypoattenuation in the jean of uncertain chronicity. Ventricles are within normal limits in size. There is no midline shift. Basal cisterns appear patent.  ORBITS: Visualized orbits appear unremarkable on this unenhanced exam. SINUSES: Mucous retention cysts or polyps in the left maxillary sinus. Moderate mucosal thickening throughout the paranasal sinuses with partial opacification of the left frontal and ethmoid sinuses. Fluid level in the left frontal sinus. Trace partial opacification of the mastoid air cells. SOFT TISSUES/SKULL: No depressed calvarial fracture identified. ANTERIOR CIRCULATION: Intracranial internal carotid arteries: Patent without focal stenosis. Anterior cerebral arteries: No focal stenosis at the level of the [place]. Hypoplastic right A1. Middle cerebral arteries: No focal stenosis at the level of the [place]. POSTERIOR CIRCULATION: Intracranial vertebral arteries: Patent without focal stenosis. Basilar artery: Patent without focal stenosis. Posterior cerebral arteries: No focal stenosis at the level of the [place]. Right posterior communicating artery provides balanced flow to the right posterior cerebral artery. OTHER: Cavernous sinuses are not well evaluated on this phase of contrast. Otherwise, no dural venous sinus thrombosis on this non-dedicated study. 1. Inflammatory paranasal sinus disease with fluid level in the left frontal sinus. Correlate with presentation for acute sinusitis. 2. No acute hemorrhage or large intracranial mass effect. 3. No large vessel intracranial occlusion or high grade stenosis. 4. Other findings as described.        Patient Instructions:      Medication List      START taking these medications    amLODIPine 10 MG tablet  Commonly known as: NORVASC  Take 1 tablet by mouth daily     atorvastatin 40 MG tablet  Commonly known as: LIPITOR  Take 1 tablet by mouth nightly     cloNIDine 0.1 MG tablet  Commonly known as: CATAPRES  Take 1 tablet by mouth 2 times daily     hydrALAZINE 100 MG tablet  Commonly known as: APRESOLINE  Take 1 tablet by mouth 3 times daily     hydroCHLOROthiazide 25 MG tablet  Commonly known as: HYDRODIURIL  Take 1 tablet by mouth daily     lisinopril 20 MG tablet  Commonly known as: PRINIVIL;ZESTRIL  Take 1 tablet by mouth daily     nicotine 7 MG/24HR  Commonly known as: NICODERM CQ  Place 1 patch onto the skin daily           Where to Get Your Medications      You can get these medications from any pharmacy    Bring a paper prescription for each of these medications  · amLODIPine 10 MG tablet  · atorvastatin 40 MG tablet  · cloNIDine 0.1 MG tablet  · hydrALAZINE 100 MG tablet  · hydroCHLOROthiazide 25 MG tablet  · lisinopril 20 MG tablet  · nicotine 7 MG/24HR           Note that 34 minutes was spent in preparing discharge papers, discussing discharge with patient, medication review, etc.    Signed:  Electronically signed by Blanca Xiao DO on 1/26/2022 at 2:17 PM

## 2022-02-22 ENCOUNTER — OFFICE VISIT (OUTPATIENT)
Dept: CARDIOLOGY CLINIC | Age: 52
End: 2022-02-22
Payer: COMMERCIAL

## 2022-02-22 VITALS
RESPIRATION RATE: 18 BRPM | DIASTOLIC BLOOD PRESSURE: 60 MMHG | HEART RATE: 92 BPM | HEIGHT: 71 IN | SYSTOLIC BLOOD PRESSURE: 110 MMHG | BODY MASS INDEX: 34.8 KG/M2 | WEIGHT: 248.6 LBS

## 2022-02-22 DIAGNOSIS — I44.30 AV BLOCK: ICD-10-CM

## 2022-02-22 DIAGNOSIS — I45.10 RBBB: ICD-10-CM

## 2022-02-22 DIAGNOSIS — G47.33 OSA (OBSTRUCTIVE SLEEP APNEA): ICD-10-CM

## 2022-02-22 DIAGNOSIS — Z72.0 TOBACCO ABUSE: ICD-10-CM

## 2022-02-22 DIAGNOSIS — I16.1 HYPERTENSIVE EMERGENCY: Primary | ICD-10-CM

## 2022-02-22 PROCEDURE — 93000 ELECTROCARDIOGRAM COMPLETE: CPT | Performed by: INTERNAL MEDICINE

## 2022-02-22 PROCEDURE — 99214 OFFICE O/P EST MOD 30 MIN: CPT | Performed by: INTERNAL MEDICINE

## 2022-02-22 NOTE — PROGRESS NOTES
OUTPATIENT CARDIOLOGY FOLLOW-UP    Name: Winter Sheets    Age: 54 y.o. Primary Care Physician: Sallie Acuña MD    Date of Service: 2/22/2022    Chief Complaint: HTN, AV block    Interim History:  He denies recent chest pain, SOB, palpitations, orthopnea, or syncope. [place] Episodes of 2:1 AV block noted during 1/2022 hospitalization (primarily while sleeping). BP today 110/60. SR on EKG. Review of Systems:   Cardiac: As per HPI  General: No fever, chills  Pulmonary: As per HPI  HEENT: No visual disturbances, difficult swallowing  GI: No nausea, vomiting  : No dysuria, hematuria  Endocrine: No thyroid disease or DM  Musculoskeletal: HAYNES x 4, no focal motor deficits  Skin: Intact, no rashes  Neuro: No headache, seizures  Psych: Currently with no depression, anxiety    Past Medical History:  HTN  Tobacco abuse  LVH  RBBB    Past Surgical History:  Past Surgical History:   Procedure Laterality Date    APPENDECTOMY         Family History: Mother: living, with no known history of CAD.   Father: DM  Sister: Hx of HTN    Social History:  Social History     Socioeconomic History    Marital status:      Spouse name: Not on file    Number of children: Not on file    Years of education: Not on file    Highest education level: Not on file   Occupational History    Not on file   Tobacco Use    Smoking status: Current Every Day Smoker     Packs/day: 1.00     Types: Cigarettes    Smokeless tobacco: Never Used   Vaping Use    Vaping Use: Never used   Substance and Sexual Activity    Alcohol use: Not Currently     Comment: daily    Drug use: Never    Sexual activity: Not on file   Other Topics Concern    Not on file   Social History Narrative    Not on file     Social Determinants of Health     Financial Resource Strain:     Difficulty of Paying Living Expenses: Not on file   Food Insecurity:     Worried About Running Out of Food in the Last Year: Not on file    Mukesh of Food in the Last Year: Not on file   Transportation Needs:     Lack of Transportation (Medical): Not on file    Lack of Transportation (Non-Medical): Not on file   Physical Activity:     Days of Exercise per Week: Not on file    Minutes of Exercise per Session: Not on file   Stress:     Feeling of Stress : Not on file   Social Connections:     Frequency of Communication with Friends and Family: Not on file    Frequency of Social Gatherings with Friends and Family: Not on file    Attends Buddhism Services: Not on file    Active Member of [address] [place] [address] or Organizations: Not on file    Attends Club or Organization Meetings: Not on file    Marital Status: Not on file   Intimate Partner Violence:     Fear of Current or Ex-Partner: Not on file    Emotionally Abused: Not on file    Physically Abused: Not on file    Sexually Abused: Not on file   Housing Stability:     Unable to Pay for Housing in the Last Year: Not on file    Number of [place] in the Last Year: Not on file    Unstable Housing in the Last Year: Not on file       Allergies:  No Known Allergies    Current Medications:  Current Outpatient Medications   Medication Sig Dispense Refill    atorvastatin (LIPITOR) 40 MG tablet Take 1 tablet by mouth nightly 30 tablet 3    cloNIDine (CATAPRES) 0.1 MG tablet Take 1 tablet by mouth 2 times daily 60 tablet 3    hydrALAZINE (APRESOLINE) 100 MG tablet Take 1 tablet by mouth 3 times daily 90 tablet 3    lisinopril (PRINIVIL;ZESTRIL) 20 MG tablet Take 1 tablet by mouth daily 30 tablet 3    amLODIPine (NORVASC) 10 MG tablet Take 1 tablet by mouth daily 30 tablet 3    hydroCHLOROthiazide (HYDRODIURIL) 25 MG tablet Take 1 tablet by mouth daily 30 tablet 3    nicotine (NICODERM CQ) 7 MG/24HR Place 1 patch onto the skin daily (Patient not taking: Reported on 2/22/2022) 30 patch 3     No current facility-administered medications for this visit.        Physical Exam:  /60   Pulse 92   Resp 18   Ht 5' 11\" (1.803 m)   Wt 248 lb 9.6 oz (112.8 kg)   BMI 34.67 kg/m²   Wt Readings from Last 3 Encounters:   02/22/22 248 lb 9.6 oz (112.8 kg)   01/15/22 233 lb 4 oz (105.8 kg)     Appearance: Awake, alert and oriented x 3, no acute respiratory distress  Skin: Intact, no rash  Head: Normocephalic, atraumatic  Eyes: EOMI, no conjunctival erythema  ENMT: No pharyngeal erythema, MMM, no rhinorrhea  Neck: Supple, no elevated JVP, no carotid bruits  Lungs: Clear to auscultation bilaterally. No wheezes, rales, or rhonchi. Cardiac: Regular rate and rhythm, +S1S2, no murmurs apparent  Abdomen: Soft, nontender, +bowel sounds  Extremities: Moves all extremities x 4, no lower extremity edema  Neurologic: No focal motor deficits apparent, normal mood and affect, alert and oriented x 3    Laboratory Tests:  Lab Results   Component Value Date    CREATININE 1.1 01/19/2022    BUN 31 (H) 01/19/2022     01/19/2022    K 3.7 01/19/2022     01/19/2022    CO2 20 (L) 01/19/2022     Lab Results   Component Value Date    MG 2.3 01/19/2022     Lab Results   Component Value Date    WBC 8.3 01/19/2022    HGB 14.0 01/19/2022    HCT 40.7 01/19/2022    MCV 99.5 01/19/2022     01/19/2022     Lab Results   Component Value Date    ALT 26 01/15/2022    AST 20 01/15/2022    ALKPHOS 93 01/15/2022    BILITOT 0.5 01/15/2022     No results found for: CKTOTAL, CKMB, CKMBINDEX, TROPONINI  No results for input(s): CKTOTAL, CKMB, CKMBINDEX, TROPHS in the last 72 hours.   No results found for: INR, PROTIME  Lab Results   Component Value Date    TSH 1.310 01/16/2022     Lab Results   Component Value Date    LABA1C 5.3 01/16/2022     No results found for: EAG  Lab Results   Component Value Date    CHOL 204 (H) 01/16/2022     Lab Results   Component Value Date    TRIG 181 (H) 01/16/2022     Lab Results   Component Value Date    HDL 36 01/16/2022     Lab Results   Component Value Date    LDLCALC 132 (H) 01/16/2022     Lab Results   Component Value Date    LABVLDL 36 01/16/2022 No results found for: CHOLHDLRATIO  No results for input(s): PROBNP in the last 72 hours. Cardiac Tests:  EKG reviewed (2/22/22): SR, rate 92, RBBB    EKG reviewed (1/2022): SB, rate 38, 2:1 AV block, RBBB     EKG reviewed (1/2022): SB, rate 57, RBBB     Telemetry reviewed (date: 1/16/2022): SR, rate 60's, episodes of 2:1 AV block overnight while sleeping     Echocardiogram reviewed: 1/16/22   Normal left ventricular systolic function.   Ejection fraction is visually estimated at 60%.  Moderate concentric left ventricular hypertrophy.   Dilated right ventricle and normal right ventricular function (TAPSE 2.9 cm).   There is doppler evidence of stage II diastolic dysfunction.   No evidence of hemodynamically significant valve disease.   Unable to estimate PASP due to incomplete tricuspid regurgitation envelope. US renal arteries: 1/18/22  1.  Bilateral renal artery stenosis.  Peak velocity in the right renal artery   was 295 centimeters/second and peak velocity in the left renal artery was 269   centimeters/second.  RAR on the right was 2.8 and RAR on the left was 2.6.       2.  Both renal veins appear patent.  Otherwise normal appearance of the   kidneys.  No hydronephrosis.  Normal appearance of the imaged bladder.       RECOMMENDATIONS:   Recommend screening CTA of the bilateral renal arteries. CTA abdomen/pelvis: 1/19/22  1. No significant renal artery stenosis identified   2. 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 Sinus bradycardia/sinus rhythm with episodes of 2:1 AV block during sleeping hours/increased vagal tone/probable YNIA (during 1/2022 hospitalization). No clinical indication for PPM at this time. TSH normal.  2. Hypertensive emergency (1/2022 hospitalization -- untreated HTN as an outpatient at that time; he was not following with a PCP regularly).  BP control improved on multiple anti-HTN agents. BP today 110/60.  3. Ongoing tobacco abuse -- 1 PPD x 30 years  4. Daily ETOH use   5. BMI 32.5 --> 34.7  6. Probable YINA     7. Hx of right wrist injury s/p surgery   8. HLD -- on statin  9. ASCVD risk score 19.7%  10. Chronic RBBB  11. Hypokalemia -- K 3.3 (normal Mg) --> 3.7  12. Moderate LVH  13. Diastolic dysfunction    - Patient started on multiple anti-HTN agents during 1/2022 hospitalization -- BP today 110/60  - Avoid AV rohith blocking agents  - Continue current medications otherwise  - Results of 1/16/22 echocardiogram outlined above  - Outpatient sleep study --> cardiac complications associated with untreated YINA reviewed today (2/22/22)  - Aggressive risk factor modifications / counseled re: tobacco and ETOH cessation  - Discussed option of outpatient cardiac monitoring    Greater than 30 minutes was spent counseling the patient, reviewing the rationale for the above recommendations and reviewing the patient's current medication list, problem list and results of all previously ordered testing.     Nelson Staley MD  [facility]) Cardiology

## 2022-05-10 ENCOUNTER — APPOINTMENT (OUTPATIENT)
Dept: CT IMAGING | Age: 52
DRG: 378 | End: 2022-05-10
Payer: COMMERCIAL

## 2022-05-10 ENCOUNTER — APPOINTMENT (OUTPATIENT)
Dept: ULTRASOUND IMAGING | Age: 52
DRG: 378 | End: 2022-05-10
Payer: COMMERCIAL

## 2022-05-10 ENCOUNTER — APPOINTMENT (OUTPATIENT)
Dept: GENERAL RADIOLOGY | Age: 52
DRG: 378 | End: 2022-05-10
Payer: COMMERCIAL

## 2022-05-10 ENCOUNTER — HOSPITAL ENCOUNTER (INPATIENT)
Age: 52
LOS: 2 days | Discharge: HOME | DRG: 378 | End: 2022-05-12
Attending: EMERGENCY MEDICINE | Admitting: STUDENT IN AN ORGANIZED HEALTH CARE EDUCATION/TRAINING PROGRAM
Payer: COMMERCIAL

## 2022-05-10 DIAGNOSIS — K92.2 GASTROINTESTINAL HEMORRHAGE, UNSPECIFIED GASTROINTESTINAL HEMORRHAGE TYPE: Primary | ICD-10-CM

## 2022-05-10 DIAGNOSIS — D64.9 ANEMIA, UNSPECIFIED TYPE: ICD-10-CM

## 2022-05-10 DIAGNOSIS — R55 SYNCOPE AND COLLAPSE: ICD-10-CM

## 2022-05-10 DIAGNOSIS — K86.89 MASS OF PANCREAS: ICD-10-CM

## 2022-05-10 DIAGNOSIS — N17.9 AKI (ACUTE KIDNEY INJURY) (HCC): ICD-10-CM

## 2022-05-10 PROBLEM — I10 BENIGN ESSENTIAL HTN: Status: ACTIVE | Noted: 2022-05-10

## 2022-05-10 LAB
ALBUMIN SERPL-MCNC: 4.3 G/DL (ref 3.5–5.2)
ALP BLD-CCNC: 85 U/L (ref 40–129)
ALT SERPL-CCNC: 14 U/L (ref 0–40)
ANION GAP SERPL CALCULATED.3IONS-SCNC: 11 MMOL/L (ref 7–16)
AST SERPL-CCNC: 21 U/L (ref 0–39)
BASOPHILS ABSOLUTE: 0.1 E9/L (ref 0–0.2)
BASOPHILS RELATIVE PERCENT: 0.9 % (ref 0–2)
BILIRUB SERPL-MCNC: <0.2 MG/DL (ref 0–1.2)
BILIRUBIN DIRECT: <0.2 MG/DL (ref 0–0.3)
BILIRUBIN, INDIRECT: NORMAL MG/DL (ref 0–1)
BUN BLDV-MCNC: 45 MG/DL (ref 6–20)
CALCIUM SERPL-MCNC: 9.3 MG/DL (ref 8.6–10.2)
CHLORIDE BLD-SCNC: 105 MMOL/L (ref 98–107)
CHLORIDE URINE RANDOM: 88 MMOL/L
CO2: 19 MMOL/L (ref 22–29)
CREAT SERPL-MCNC: 1.7 MG/DL (ref 0.7–1.2)
CREATININE URINE: 34 MG/DL (ref 40–278)
D DIMER: <200 NG/ML DDU
EKG ATRIAL RATE: 67 BPM
EKG P AXIS: 178 DEGREES
EKG P-R INTERVAL: 162 MS
EKG Q-T INTERVAL: 430 MS
EKG QRS DURATION: 158 MS
EKG QTC CALCULATION (BAZETT): 454 MS
EKG R AXIS: 115 DEGREES
EKG T AXIS: -173 DEGREES
EKG VENTRICULAR RATE: 67 BPM
EOSINOPHILS ABSOLUTE: 0.28 E9/L (ref 0.05–0.5)
EOSINOPHILS RELATIVE PERCENT: 2.6 % (ref 0–6)
GFR AFRICAN AMERICAN: 52
GFR NON-AFRICAN AMERICAN: 43 ML/MIN/1.73
GLUCOSE BLD-MCNC: 101 MG/DL (ref 74–99)
HCT VFR BLD CALC: 33 % (ref 37–54)
HCT VFR BLD CALC: 33.6 % (ref 37–54)
HEMOGLOBIN: 11.1 G/DL (ref 12.5–16.5)
HEMOGLOBIN: 11.3 G/DL (ref 12.5–16.5)
IMMATURE GRANULOCYTES #: 0.05 E9/L
IMMATURE GRANULOCYTES %: 0.5 % (ref 0–5)
LIPASE: 61 U/L (ref 13–60)
LYMPHOCYTES ABSOLUTE: 2.2 E9/L (ref 1.5–4)
LYMPHOCYTES RELATIVE PERCENT: 20.7 % (ref 20–42)
MCH RBC QN AUTO: 33.7 PG (ref 26–35)
MCHC RBC AUTO-ENTMCNC: 33.6 % (ref 32–34.5)
MCV RBC AUTO: 100.3 FL (ref 80–99.9)
MONOCYTES ABSOLUTE: 0.78 E9/L (ref 0.1–0.95)
MONOCYTES RELATIVE PERCENT: 7.3 % (ref 2–12)
NEUTROPHILS ABSOLUTE: 7.23 E9/L (ref 1.8–7.3)
NEUTROPHILS RELATIVE PERCENT: 68 % (ref 43–80)
OSMOLALITY URINE: 391 MOSM/KG (ref 300–900)
PDW BLD-RTO: 13.6 FL (ref 11.5–15)
PLATELET # BLD: 239 E9/L (ref 130–450)
PMV BLD AUTO: 10.1 FL (ref 7–12)
POTASSIUM REFLEX MAGNESIUM: 3.8 MMOL/L (ref 3.5–5)
POTASSIUM, UR: 15.3 MMOL/L
PRO-BNP: 88 PG/ML (ref 0–125)
PROTEIN PROTEIN: <4 MG/DL (ref 0–12)
PROTEIN/CREAT RATIO: 0.1
PROTEIN/CREAT RATIO: 0.1 (ref 0–0.2)
RBC # BLD: 3.29 E12/L (ref 3.8–5.8)
SODIUM BLD-SCNC: 135 MMOL/L (ref 132–146)
SODIUM URINE: 98 MMOL/L
TOTAL PROTEIN: 7.1 G/DL (ref 6.4–8.3)
TROPONIN, HIGH SENSITIVITY: 13 NG/L (ref 0–11)
TROPONIN, HIGH SENSITIVITY: 14 NG/L (ref 0–11)
UREA NITROGEN, UR: 446 MG/DL (ref 800–1666)
WBC # BLD: 10.6 E9/L (ref 4.5–11.5)

## 2022-05-10 PROCEDURE — 83690 ASSAY OF LIPASE: CPT

## 2022-05-10 PROCEDURE — 93005 ELECTROCARDIOGRAM TRACING: CPT | Performed by: STUDENT IN AN ORGANIZED HEALTH CARE EDUCATION/TRAINING PROGRAM

## 2022-05-10 PROCEDURE — 82570 ASSAY OF URINE CREATININE: CPT

## 2022-05-10 PROCEDURE — 74176 CT ABD & PELVIS W/O CONTRAST: CPT

## 2022-05-10 PROCEDURE — 96360 HYDRATION IV INFUSION INIT: CPT

## 2022-05-10 PROCEDURE — 80048 BASIC METABOLIC PNL TOTAL CA: CPT

## 2022-05-10 PROCEDURE — 70450 CT HEAD/BRAIN W/O DYE: CPT

## 2022-05-10 PROCEDURE — C9113 INJ PANTOPRAZOLE SODIUM, VIA: HCPCS | Performed by: STUDENT IN AN ORGANIZED HEALTH CARE EDUCATION/TRAINING PROGRAM

## 2022-05-10 PROCEDURE — 6360000002 HC RX W HCPCS: Performed by: STUDENT IN AN ORGANIZED HEALTH CARE EDUCATION/TRAINING PROGRAM

## 2022-05-10 PROCEDURE — 82436 ASSAY OF URINE CHLORIDE: CPT

## 2022-05-10 PROCEDURE — 93010 ELECTROCARDIOGRAM REPORT: CPT | Performed by: INTERNAL MEDICINE

## 2022-05-10 PROCEDURE — 6370000000 HC RX 637 (ALT 250 FOR IP): Performed by: STUDENT IN AN ORGANIZED HEALTH CARE EDUCATION/TRAINING PROGRAM

## 2022-05-10 PROCEDURE — 84484 ASSAY OF TROPONIN QUANT: CPT

## 2022-05-10 PROCEDURE — 99285 EMERGENCY DEPT VISIT HI MDM: CPT

## 2022-05-10 PROCEDURE — 85018 HEMOGLOBIN: CPT

## 2022-05-10 PROCEDURE — 84156 ASSAY OF PROTEIN URINE: CPT

## 2022-05-10 PROCEDURE — 85014 HEMATOCRIT: CPT

## 2022-05-10 PROCEDURE — 76770 US EXAM ABDO BACK WALL COMP: CPT

## 2022-05-10 PROCEDURE — 2580000003 HC RX 258: Performed by: STUDENT IN AN ORGANIZED HEALTH CARE EDUCATION/TRAINING PROGRAM

## 2022-05-10 PROCEDURE — 85025 COMPLETE CBC W/AUTO DIFF WBC: CPT

## 2022-05-10 PROCEDURE — 71045 X-RAY EXAM CHEST 1 VIEW: CPT

## 2022-05-10 PROCEDURE — 84133 ASSAY OF URINE POTASSIUM: CPT

## 2022-05-10 PROCEDURE — A4216 STERILE WATER/SALINE, 10 ML: HCPCS | Performed by: STUDENT IN AN ORGANIZED HEALTH CARE EDUCATION/TRAINING PROGRAM

## 2022-05-10 PROCEDURE — 84540 ASSAY OF URINE/UREA-N: CPT

## 2022-05-10 PROCEDURE — 84300 ASSAY OF URINE SODIUM: CPT

## 2022-05-10 PROCEDURE — 85378 FIBRIN DEGRADE SEMIQUANT: CPT

## 2022-05-10 PROCEDURE — 36415 COLL VENOUS BLD VENIPUNCTURE: CPT

## 2022-05-10 PROCEDURE — 2060000000 HC ICU INTERMEDIATE R&B

## 2022-05-10 PROCEDURE — 80076 HEPATIC FUNCTION PANEL: CPT

## 2022-05-10 PROCEDURE — 83880 ASSAY OF NATRIURETIC PEPTIDE: CPT

## 2022-05-10 PROCEDURE — 83935 ASSAY OF URINE OSMOLALITY: CPT

## 2022-05-10 RX ORDER — ACETAMINOPHEN 650 MG/1
650 SUPPOSITORY RECTAL EVERY 6 HOURS PRN
Status: DISCONTINUED | OUTPATIENT
Start: 2022-05-10 | End: 2022-05-12 | Stop reason: HOSPADM

## 2022-05-10 RX ORDER — PANTOPRAZOLE SODIUM 40 MG/10ML
40 INJECTION, POWDER, LYOPHILIZED, FOR SOLUTION INTRAVENOUS 2 TIMES DAILY
Status: DISCONTINUED | OUTPATIENT
Start: 2022-05-10 | End: 2022-05-12 | Stop reason: ALTCHOICE

## 2022-05-10 RX ORDER — SODIUM CHLORIDE 9 MG/ML
INJECTION, SOLUTION INTRAVENOUS PRN
Status: DISCONTINUED | OUTPATIENT
Start: 2022-05-10 | End: 2022-05-12 | Stop reason: HOSPADM

## 2022-05-10 RX ORDER — POTASSIUM CHLORIDE 20 MEQ/1
40 TABLET, EXTENDED RELEASE ORAL PRN
Status: DISCONTINUED | OUTPATIENT
Start: 2022-05-10 | End: 2022-05-12 | Stop reason: HOSPADM

## 2022-05-10 RX ORDER — SODIUM CHLORIDE 0.9 % (FLUSH) 0.9 %
10 SYRINGE (ML) INJECTION PRN
Status: DISCONTINUED | OUTPATIENT
Start: 2022-05-10 | End: 2022-05-12 | Stop reason: HOSPADM

## 2022-05-10 RX ORDER — SUCRALFATE 1 G/1
1 TABLET ORAL
Status: DISCONTINUED | OUTPATIENT
Start: 2022-05-10 | End: 2022-05-12 | Stop reason: HOSPADM

## 2022-05-10 RX ORDER — HYDROCHLOROTHIAZIDE 25 MG/1
25 TABLET ORAL EVERY MORNING
Status: ON HOLD | COMMUNITY
End: 2022-05-12 | Stop reason: HOSPADM

## 2022-05-10 RX ORDER — POTASSIUM CHLORIDE 7.45 MG/ML
10 INJECTION INTRAVENOUS PRN
Status: DISCONTINUED | OUTPATIENT
Start: 2022-05-10 | End: 2022-05-12 | Stop reason: HOSPADM

## 2022-05-10 RX ORDER — LISINOPRIL 20 MG/1
20 TABLET ORAL EVERY MORNING
COMMUNITY

## 2022-05-10 RX ORDER — ONDANSETRON 4 MG/1
4 TABLET, ORALLY DISINTEGRATING ORAL EVERY 8 HOURS PRN
Status: DISCONTINUED | OUTPATIENT
Start: 2022-05-10 | End: 2022-05-12 | Stop reason: HOSPADM

## 2022-05-10 RX ORDER — LANOLIN ALCOHOL/MO/W.PET/CERES
3 CREAM (GRAM) TOPICAL NIGHTLY PRN
Status: DISCONTINUED | OUTPATIENT
Start: 2022-05-10 | End: 2022-05-12 | Stop reason: HOSPADM

## 2022-05-10 RX ORDER — HYDRALAZINE HYDROCHLORIDE 20 MG/ML
10 INJECTION INTRAMUSCULAR; INTRAVENOUS EVERY 6 HOURS PRN
Status: DISCONTINUED | OUTPATIENT
Start: 2022-05-10 | End: 2022-05-12 | Stop reason: HOSPADM

## 2022-05-10 RX ORDER — SODIUM CHLORIDE 0.9 % (FLUSH) 0.9 %
10 SYRINGE (ML) INJECTION EVERY 12 HOURS SCHEDULED
Status: DISCONTINUED | OUTPATIENT
Start: 2022-05-10 | End: 2022-05-12 | Stop reason: HOSPADM

## 2022-05-10 RX ORDER — SODIUM CHLORIDE 9 MG/ML
INJECTION, SOLUTION INTRAVENOUS CONTINUOUS
Status: DISCONTINUED | OUTPATIENT
Start: 2022-05-10 | End: 2022-05-12 | Stop reason: HOSPADM

## 2022-05-10 RX ORDER — AMLODIPINE BESYLATE 10 MG/1
5 TABLET ORAL EVERY MORNING
COMMUNITY

## 2022-05-10 RX ORDER — SENNA PLUS 8.6 MG/1
1 TABLET ORAL DAILY PRN
Status: DISCONTINUED | OUTPATIENT
Start: 2022-05-10 | End: 2022-05-12 | Stop reason: HOSPADM

## 2022-05-10 RX ORDER — 0.9 % SODIUM CHLORIDE 0.9 %
1000 INTRAVENOUS SOLUTION INTRAVENOUS ONCE
Status: COMPLETED | OUTPATIENT
Start: 2022-05-10 | End: 2022-05-10

## 2022-05-10 RX ORDER — ACETAMINOPHEN 325 MG/1
650 TABLET ORAL EVERY 6 HOURS PRN
Status: DISCONTINUED | OUTPATIENT
Start: 2022-05-10 | End: 2022-05-12 | Stop reason: HOSPADM

## 2022-05-10 RX ORDER — ONDANSETRON 2 MG/ML
4 INJECTION INTRAMUSCULAR; INTRAVENOUS EVERY 6 HOURS PRN
Status: DISCONTINUED | OUTPATIENT
Start: 2022-05-10 | End: 2022-05-12 | Stop reason: HOSPADM

## 2022-05-10 RX ADMIN — SODIUM CHLORIDE 80 MG: 9 INJECTION INTRAMUSCULAR; INTRAVENOUS; SUBCUTANEOUS at 15:38

## 2022-05-10 RX ADMIN — SODIUM CHLORIDE, PRESERVATIVE FREE 10 ML: 5 INJECTION INTRAVENOUS at 20:38

## 2022-05-10 RX ADMIN — PANTOPRAZOLE SODIUM 40 MG: 40 INJECTION, POWDER, FOR SOLUTION INTRAVENOUS at 20:38

## 2022-05-10 RX ADMIN — SODIUM CHLORIDE: 9 INJECTION, SOLUTION INTRAVENOUS at 15:40

## 2022-05-10 RX ADMIN — SODIUM CHLORIDE 1000 ML: 9 INJECTION, SOLUTION INTRAVENOUS at 09:52

## 2022-05-10 RX ADMIN — SUCRALFATE 1 G: 1 TABLET ORAL at 17:11

## 2022-05-10 RX ADMIN — SUCRALFATE 1 G: 1 TABLET ORAL at 20:39

## 2022-05-10 ASSESSMENT — ENCOUNTER SYMPTOMS
EYE DISCHARGE: 0
VOMITING: 0
SORE THROAT: 0
NAUSEA: 0
BACK PAIN: 0
COUGH: 0
DIARRHEA: 0
SINUS PRESSURE: 0
ABDOMINAL PAIN: 0
WHEEZING: 0
SHORTNESS OF BREATH: 0
EYE PAIN: 0

## 2022-05-10 ASSESSMENT — LIFESTYLE VARIABLES: HOW OFTEN DO YOU HAVE A DRINK CONTAINING ALCOHOL: NEVER

## 2022-05-10 NOTE — PROGRESS NOTES
database initiated patient is A&O independent from home w wife. States he uses no assistive devices and is RA at baseline.

## 2022-05-10 NOTE — H&P
Internal Medicine History & Physical     Name: Britta Adamson  : 1970  Chief Complaint: Loss of Consciousness  Primary Care Physician: Jessy Mack MD  Admission date: 5/10/2022  Date of service: 5/10/2022     History of Present Illness  Mirna Stern is a 54y.o. 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 Was standing talking to someone, passed out, completely lost consciousness for a brief period. No loss of bowel or bladder, no tongue biting, states onlookers did not mention that he was shaking or showing seizure like symptoms. No prior history of syncope or seizures. He states he had some chest pain, felt light headed and numb and then he does not remember anything until he awoke. He does have significant cardiac risk factors. Takes over 3 grams of IBU per day for msk pain in numerous joints  50 pack year history History of heavy etoh but stopped in January No others drugs         No past medical history on file. Past Surgical History:   Procedure Laterality Date    APPENDECTOMY         No family history on file. Social History  Patient lives at home with wife . At baseline patient ambulates with out assistance   Illicit drugs: Denies   TOBACCO:   reports that he has been smoking cigarettes. He has been smoking about 1.00 pack per day. He has never used smokeless tobacco.  ETOH:   reports previous alcohol use. Home Medications  Prior to Admission medications    Medication Sig Start Date End Date Taking?  Authorizing Provider   amLODIPine (NORVASC) 10 MG tablet Take 10 mg by mouth every morning   Yes Historical Provider, MD   hydroCHLOROthiazide (HYDRODIURIL) 25 MG tablet Take 25 mg by mouth every morning   Yes Historical Provider, MD   lisinopril (PRINIVIL;ZESTRIL) 20 MG tablet Take 20 mg by mouth every morning   Yes Historical Provider, MD   atorvastatin (LIPITOR) 40 MG tablet Take 1 tablet by mouth nightly 1/19/22   Brittaney Strauss DO   cloNIDine (CATAPRES) 0.1 MG tablet Take 1 tablet by mouth 2 times daily 1/19/22   Mary Ordoñez DO   hydrALAZINE (APRESOLINE) 100 MG tablet Take 1 tablet by mouth 3 times daily 1/19/22   Brittaney Strauss DO       Allergies  No Known Allergies    Review of Systems  Please see HPI above. All bolded are positive. All un-bolded are negative. Constitutional Symptoms: fever, chills, fatigue, generalized weakness, diaphoresis, increase in thirst, loss of appetite  Eyes: vision change   Ears, Nose, Mouth, Throat: hearing loss, nasal congestion, sores in the mouth  Cardiovascular: chest pain, chest heaviness, palpitations  Respiratory: shortness of breath, wheezing, coughing  Gastrointestinal: abdominal pain, nausea, vomiting, diarrhea, constipation, melena, hematochezia, hematemesis  Genitourinary: dysuria, hematuria, increased frequency  Musculoskeletal: lower extremity edema, myalgias, arthralgias, back pain  Integumentary: rashes, itching   Neurological: headache, lightheadedness, dizziness, confusion, syncope, numbness, tingling, focal weakness  Psychiatric: depression, suicidal ideation, anxiety  Endocrine: unintentional weight change  Hematologic/Lymphatic: lymphadenopathy, easy bruising, easy bleeding   Allergic/Immunologic: recurrent infections      Objective  VITALS:  BP (!) 99/52   Pulse 65   Temp 98.1 °F (36.7 °C) (Oral)   Resp 14   SpO2 99%     Physical Exam:  General: awake, alert, oriented to person, place, time, and purpose, appears stated age, cooperative, no acute distress, pleasant, appropriate mood  Eyes: conjunctivae/corneas clear, sclera non icteric, EOMI  Ears: no obvious scars, no lesions, no masses, hearing intact  Mouth: mucous membranes moist, no obvious oral sores  Head: normocephalic, atraumatic  Neck: no JVD, no adenopathy, no thyromegaly, neck is supple, trachea is midline  Back: ROM normal, no CVA tenderness.   Chest: no pain on palpation  Lungs: clear to auscultation bilaterally, without rhonchi, crackle, wheezing, or rale, no retractions or use of accessory muscles  Heart: regular rate and regular rhythm, no murmur, normal S1, S2  Abdomen: soft, non-tender; bowel sounds normal; no masses, no organomegaly  : Deferred   Extremities: no lower extremity edema, extremities atraumatic, no cyanosis, no clubbing, 2+ pedal pulses palpated  Skin: normal color, normal texture, normal turgor, no rashes, no lesions  Neurologic:5/5 muscle strength throughout, normal muscle tone throughout, face symmetric, hearing intact, tongue midline, speech appropriate without slurring, sensation to fine touch intact in upper and lower extremities    Labs-   Lab Results   Component Value Date    WBC 10.6 05/10/2022    HGB 11.1 (L) 05/10/2022    HCT 33.0 (L) 05/10/2022     05/10/2022     05/10/2022    K 3.8 05/10/2022     05/10/2022    CREATININE 1.7 (H) 05/10/2022    BUN 45 (H) 05/10/2022    CO2 19 (L) 05/10/2022    GLUCOSE 101 (H) 05/10/2022    ALT 14 05/10/2022    AST 21 05/10/2022     No results found for: CKTOTAL, CKMB, CKMBINDEX, TROPONINI    Last echocardiogram:      Recent Radiological Studies:  CT ABDOMEN PELVIS WO CONTRAST Additional Contrast? None   Final Result   1. There is mild prominence or thickening in the distal pancreatic tail, but   no change from January 2022. Considerations include lack of fatty   infiltration, splenic nodule within the pancreas, versus an early pancreatic   mass. Consider pancreas MRI. 2.  No urinary stones or obstruction. 3.  A minimal left abdomen small bowel ileus. 4.  No obvious bowel wall thickening or significant inflammatory change. 5.  Chronic small umbilical hernia containing only fat. RECOMMENDATIONS:   Unavailable         CT Head WO Contrast   Final Result   No acute intracranial abnormality.   Specifically, there is no acute   intracranial hemorrhage      Mild mucosal thickening seen within the right and left maxillary sinuses and   ethmoid air cells. XR CHEST PORTABLE   Final Result   No acute process. Assessment  Active Hospital Problems    Diagnosis     GI bleed [K92.2]      Priority: Medium       Patient Active Problem List    Diagnosis Date Noted    GI bleed 05/10/2022     Priority: Medium    Hypertensive emergency 01/15/2022       Plan  · GI bleeding   · General surgery consult for EGD   · History and presentation high index of suspicion for PUD NPO at midnight   · Syncope   · Cardiology consultation   · Recent ECHO reviewed   · Significant cardiac risk factors   · Had chest pain prior to event   · Telemetry   · PT/OT  · Home medications to be reconciled and confirmed prior to being ordered  · DVT prophylaxis   · Code Status Full   · Discharge plan: TBD pending clinical improvement     Juana Self MD  Internal medicine   5/10/2022  1:10 PM    I can be reached through [employer]. NOTE:  This report was transcribed using voice recognition software. Every effort was made to ensure accuracy; however, inadvertent computerized transcription errors may be present.

## 2022-05-10 NOTE — CONSULTS
GENERAL SURGERY  CONSULT NOTE  5/10/2022    Physician Consulted: Dr. Saad Augustin  Reason for Consult: GI bleed  Referring Physician: Dr. Say Granado    [facility]  Jamar Chandler is a 54 y.o. male who presents for evaluation of syncopal event. General surgery was consulted for GI bleed. Patient states that he was at work and the next thing he knew he woke up on the ground. He denies history of seizure. He denies abdominal pain, nausea, vomiting, melena, and hematochezia. He has never had anything like this before. He is not on any anticoagulation. He has never had EGD/colonoscopy. He has had an appendectomy. He denies personal and family history of ulcerative colitis, Crohn's disease, colon cancer, and irritable bowel disease. She reports that he previously abused alcohol, but he quit 4 months ago. He smokes 1 1/2 ppd. He denies other drug use. He takes tylenol regularly for \"pains\". CT AP w/o contrast unremarkable for acute intra-abdominal pathology. Cr 1.7, LFTs WNL, HgB 11.1 from baseline 14. He has been afebrile, hemodynamically stable, and without leukocytosis. No past medical history on file. Past Surgical History:   Procedure Laterality Date    APPENDECTOMY         Medications Prior to Admission:    Prior to Admission medications    Medication Sig Start Date End Date Taking?  Authorizing Provider   amLODIPine (NORVASC) 10 MG tablet Take 10 mg by mouth every morning   Yes Historical Provider, MD   hydroCHLOROthiazide (HYDRODIURIL) 25 MG tablet Take 25 mg by mouth every morning   Yes Historical Provider, MD   lisinopril (PRINIVIL;ZESTRIL) 20 MG tablet Take 20 mg by mouth every morning   Yes Historical Provider, MD   atorvastatin (LIPITOR) 40 MG tablet Take 1 tablet by mouth nightly 1/19/22   Mary Ordoñez DO   cloNIDine (CATAPRES) 0.1 MG tablet Take 1 tablet by mouth 2 times daily 1/19/22   Mary Ordoñez DO   hydrALAZINE (APRESOLINE) 100 MG tablet Take 1 tablet by mouth 3 times daily 1/19/22   Itzel Gordon DO No Known Allergies    No family history on file. Social History     Tobacco Use    Smoking status: Current Every Day Smoker     Packs/day: 1.00     Types: Cigarettes    Smokeless tobacco: Never Used   Vaping Use    Vaping Use: Never used   Substance Use Topics    Alcohol use: Not Currently     Comment: daily    Drug use: Never         Review of Systems   General ROS: negative for - chills or fever  Hematological and Lymphatic ROS: negative for - bleeding problems or blood clots  Respiratory ROS: no cough, shortness of breath, or wheezing  Cardiovascular ROS: no chest pain or dyspnea on exertion  Gastrointestinal ROS: no abdominal pain, change in bowel habits, or black or bloody stools  Genito-Urinary ROS: no dysuria, trouble voiding, or hematuria  Musculoskeletal ROS: negative for - muscle pain or muscular weakness      PHYSICAL EXAM:    Vitals:    05/10/22 0945   BP: (!) 99/52   Pulse: 65   Resp:    Temp:    SpO2: 99%       General Appearance:  awake, alert, oriented, in no acute distress  Skin:  Skin color, texture, turgor normal. No rashes or lesions. Head/face:  NCAT  Eyes:  No gross abnormalities. Lungs:  Normal work of breathing on room air  Heart:  Regular rate, normotensive  Abdomen:  Soft, non-tender, non-distended  Extremities: Extremities warm to touch, pink, with no edema. Male Rectal:  Normal male: no hemorrhoids, normal rectal tone, no masses, prostate not enlarged, no nodularity  Stool: Consistency- soft and brown and Guiac positive    LABS:    CBC  Recent Labs     05/10/22  0922   WBC 10.6   HGB 11.1*   HCT 33.0*        BMP  Recent Labs     05/10/22  0922      K 3.8      CO2 19*   BUN 45*   CREATININE 1.7*   CALCIUM 9.3     Liver Function  Recent Labs     05/10/22  0922   LIPASE 61*   BILITOT <0.2   BILIDIR <0.2   AST 21   ALT 14   ALKPHOS 85   PROT 7.1   LABALBU 4.3     No results for input(s): LACTATE in the last 72 hours.   No results for input(s): INR, PTT in the last 72 hours. Invalid input(s): PT    RADIOLOGY    CT ABDOMEN PELVIS WO CONTRAST Additional Contrast? None    Result Date: 5/10/2022  EXAMINATION: CT OF THE ABDOMEN AND PELVIS WITHOUT CONTRAST 5/10/2022 11:21 am TECHNIQUE: CT of the abdomen and pelvis was performed without the administration of intravenous contrast. Multiplanar reformatted images are provided for review. Automated exposure control, iterative reconstruction, and/or weight based adjustment of the mA/kV was utilized to reduce the radiation dose to as low as reasonably achievable. Dose is total DLP 1552.60 M Gy cm. COMPARISON: CT abdomen and pelvis 01/19/2022. HISTORY: ORDERING SYSTEM PROVIDED HISTORY: yassine, gibleed TECHNOLOGIST PROVIDED HISTORY: Reason for exam:->yassine, gibleed Additional Contrast?->None Decision Support Exception - unselect if not a suspected or confirmed emergency medical condition->Emergency Medical Condition (MA) FINDINGS: Lower Chest: A stable 3 mm pleural based nodule anterior margin right middle lobe on series 3, image 15. This does not need additional follow-up. No basilar infiltrate or effusion. Heart size normal.  No hiatal hernia. Organs: Liver: Liver length 18 cm. No obvious liver masses or bile duct dilatation. Liver margins are smooth. The gallbladder has normal size. No calcified stones or wall thickening. The common bile duct measures 2 mm. The pancreas has a chronic area of slight thickening in the pancreatic tail measuring 2.4 cm x 2.3 cm by 1.7 cm. Stable appearance but there are no older studies than January 2022. This could just be an area of less fatty infiltration. Additional evaluation could be done with pancreas MRI. Pancreatic duct is not dilated. No obvious pancreatic cyst. The spleen measures 11 cm, within normal size. A 1 cm accessory splenic nodule. The adrenal glands are normal. Kidneys: No renal or ureter stone or hydronephrosis on either side.   No obvious renal cortical lesions or cysts. Normal aorta and IVC. Minimal calcification near the aortic bifurcation. No stenosis or aneurysm. Left renal vein crosses anterior to the aorta. Lymph nodes: Scattered small retroperitoneal lymph nodes just below the renal vessels measuring up to 0.9 cm. A proximal aortocaval lymph node just below the renal vessels measures 1.9 cm long by 0.8 cm diameter. A few tiny lymph nodes in the gastrohepatic ligament and near the celiac artery. A few small right upper quadrant lymph nodes. A few scattered tiny lymph nodes in the small bowel mesentery. None of these are definitely pathological. GI/Bowel: No hiatal hernia. Non distended stomach. A couple slightly dilated left mid abdomen small bowel loops contain fluid and small amounts of gas, but without a definite transition point. Nonspecific appearance. Distal small bowel loops are not dilated. Chronic tiny umbilical hernia containing only fat. Scarring right lower quadrant abdominal wall. No inguinal hernia. The appendix is not identified and may be absent. No evidence of appendicitis. Average amount of residual stool. No acute inflammatory changes or obstruction. No diverticulitis. Pelvis: The urinary bladder was moderately distended. No stones or wall thickening. No distal ureter stone. Prostate and seminal vesicles are small in size. Peritoneum/Retroperitoneum: No free air, free fluid or abscess. Bones/Soft Tissues: No acute fracture or compression deformity. Moderate spurring in the lower thoracic spine. Moderate lumbar degeneration. At L2/3, there is a moderate disc bulge with 3 mm posterior subluxation and moderate loss of disc height. Mild central stenosis. At L3/4, there is a large disc bulge, flattening of the disc space with central vacuum disc degeneration and mild central stenosis. A 2 mm posterior subluxation.   At L4/L5, there is flattening of the disc with moderate vacuum degeneration, moderate disc bulge, with evidence for annular disc tear and or small disc herniation. Small amounts of nitrogen gas are seen posterior to the disc margin to the left of midline. Mild central stenosis and moderate neural foraminal stenosis. At L5/S1 there is flattening of the disc space with moderate vacuum disc degeneration and a mild disc bulge. Moderate neural foraminal stenosis. 1.  There is mild prominence or thickening in the distal pancreatic tail, but no change from January 2022. Considerations include lack of fatty infiltration, splenic nodule within the pancreas, versus an early pancreatic mass. Consider pancreas MRI. 2.  No urinary stones or obstruction. 3.  A minimal left abdomen small bowel ileus. 4.  No obvious bowel wall thickening or significant inflammatory change. 5.  Chronic small umbilical hernia containing only fat. RECOMMENDATIONS: Unavailable     CT Head WO Contrast    Result Date: 5/10/2022  EXAMINATION: CT OF THE HEAD WITHOUT CONTRAST  5/10/2022 10:30 am TECHNIQUE: CT of the head was performed without the administration of intravenous contrast. Dose modulation, iterative reconstruction, and/or weight based adjustment of the mA/kV was utilized to reduce the radiation dose to as low as reasonably achievable. COMPARISON: None. HISTORY: ORDERING SYSTEM PROVIDED HISTORY: syncope TECHNOLOGIST PROVIDED HISTORY: Reason for exam:->syncope Has a \"code stroke\" or \"stroke alert\" been called? ->No Decision Support Exception - unselect if not a suspected or confirmed emergency medical condition->Emergency Medical Condition (MA) FINDINGS: BRAIN/VENTRICLES: There is no acute intracranial hemorrhage, mass effect or midline shift. No abnormal extra-axial fluid collection. The gray-white differentiation is maintained without evidence of an acute infarct. There is no evidence of hydrocephalus. ORBITS: The visualized portion of the orbits demonstrate no acute abnormality.  SINUSES: The visualized paranasal sinuses and mastoid air cells demonstrate no acute abnormality. There is mucosal thickening seen within the maxillary sinuses and ethmoid air cells. SOFT TISSUES/SKULL:  No acute abnormality of the visualized skull or soft tissues. No acute intracranial abnormality. Specifically, there is no acute intracranial hemorrhage Mild mucosal thickening seen within the right and left maxillary sinuses and ethmoid air cells. XR CHEST PORTABLE    Result Date: 5/10/2022  EXAMINATION: ONE XRAY VIEW OF THE CHEST 5/10/2022 8:38 am COMPARISON: 15 January 2022 HISTORY: ORDERING SYSTEM PROVIDED HISTORY: syncope TECHNOLOGIST PROVIDED HISTORY: Reason for exam:->syncope FINDINGS: The lungs are without acute focal process. There is no effusion or pneumothorax. The cardiomediastinal silhouette is without acute process. The osseous structures are without acute process. No acute process. US RETROPERITONEAL COMPLETE    Result Date: 5/10/2022  EXAMINATION: RETROPERITONEAL ULTRASOUND OF THE KIDNEYS AND URINARY BLADDER 5/10/2022 COMPARISON: CT abdomen and pelvis from the same day. HISTORY: ORDERING SYSTEM PROVIDED HISTORY: LEE TECHNOLOGIST PROVIDED HISTORY: Reason for exam:->LEE What reading provider will be dictating this exam?->CRC FINDINGS: Kidneys: The right kidney measures 10.4 cm in length and the left kidney measures 11.3 cm in length. The corticomedullary differentiation and cortical thickness is decreased bilaterally. No renal mass, renal cysts, nor intrarenal calcification. There is no hydronephrosis. Bladder: No intrinsic mass, intrinsic calcification, nor definite wall thickening. The bladder is not well distended for this exam.  Bilateral ureteric jets seen. 1.  Bilateral renal cortical thinning. There is no hydronephrosis. 2.  Bladder not well distended for this exam.  No gross abnormality. ASSESSMENT:  54 y.o. male with anemia. Concern for liver disease and possible upper GI bleed.     PLAN:  EGD tomorrow 5/11  [id number] for clear liquid diet today  NPO at midnight  PPI BID  Carafate    Patient findings and plan discussed with Dr. Mary Lou Villalta.     Electronically signed by Cee Ohara MD on 5/10/22 at 2:37 PM EDT

## 2022-05-10 NOTE — ED PROVIDER NOTES
51-year-old male, , presenting to the emergency room for syncope, was at work today, got a twinge of chest pain, and then felt lightheaded, had associated numbness in his arms and lost consciousness. Came to, he is not sure if he hit his head, he is on any blood thinners, denies any recent travel, injuries, surgeries, shortness of breath, history of blood clots. States he is a history of hypertension is on blood pressure medication. Symptoms came on suddenly, resolved, lightheadedness associated with numbness in his hands, symptoms moderate in severity, nothing make them better or worse. Review of Systems   Constitutional: Negative for chills and fever. HENT: Negative for ear pain, sinus pressure and sore throat. Eyes: Negative for pain and discharge. Respiratory: Negative for cough, shortness of breath and wheezing. Cardiovascular: Positive for chest pain. Gastrointestinal: Negative for abdominal pain, diarrhea, nausea and vomiting. Genitourinary: Negative for dysuria and frequency. Musculoskeletal: Negative for arthralgias and back pain. Skin: Negative for rash and wound. Neurological: Positive for syncope and numbness. Negative for weakness and headaches. Hematological: Negative for adenopathy. All other systems reviewed and are negative. Physical Exam  Vitals and nursing note reviewed. Constitutional:       Appearance: He is well-developed. He is not ill-appearing or toxic-appearing. HENT:      Head: Normocephalic and atraumatic. Right Ear: External ear normal.      Left Ear: External ear normal.      Nose: Nose normal.      Mouth/Throat:      Mouth: Mucous membranes are moist.   Eyes:      Extraocular Movements: Extraocular movements intact. Conjunctiva/sclera: Conjunctivae normal.      Pupils: Pupils are equal, round, and reactive to light. Cardiovascular:      Rate and Rhythm: Normal rate and regular rhythm.       Heart sounds: Normal heart sounds. No murmur heard. Pulmonary:      Effort: Pulmonary effort is normal. No respiratory distress. Breath sounds: Normal breath sounds. No wheezing or rales. Abdominal:      General: Bowel sounds are normal.      Palpations: Abdomen is soft. Tenderness: There is no abdominal tenderness. There is no guarding or rebound. Musculoskeletal:         General: No deformity. Normal range of motion. Cervical back: Normal range of motion and neck supple. Skin:     General: Skin is warm and dry. Neurological:      General: No focal deficit present. Mental Status: He is alert and oriented to person, place, and time. Cranial Nerves: No cranial nerve deficit. Sensory: No sensory deficit. Motor: No weakness. Procedures     MDM     ED Course as of 05/10/22 1450   Tue May 10, 2022   0289 Hemoccult is positive was not grossly positive, blood pressure improving with fluids, orthostatics were negative [JG]   1028 D-dimer less than 200, PE less likely [JG]   1211 Dr. Dominga Daniels will call back [JG]   1257 Dr. Dominga Daniels admitted patient, requested surgery be consulted, consulted Dr. Collins Rose [JG]   [id number] 72-year-old male presenting emergency department for loss of consciousness, was walking up to somebody at work and apparently lost consciousness, and some chest pain just prior, blood pressure was soft upon arrival, was given fluids with improvement of blood pressure, orthostatics were negative. Hemoglobin showed a three-point drop, rectal exam checked, was Hemoccult positive, D-dimer less than 200, CT abdomen pelvis showed a possible pancreatic mass, lipase was added, patient was admitted to hospital further work-up and management.  [JG]      ED Course User Index  [JG] Piyush Gutierres MD      72-year-old male presenting emergency department for loss of consciousness, was walking up to somebody at work and apparently lost consciousness, and some chest pain just prior, blood pressure was soft upon arrival, was given fluids with improvement of blood pressure, orthostatics were negative. Hemoglobin showed a three-point drop, rectal exam checked, was Hemoccult positive, D-dimer less than 200, CT abdomen pelvis showed a possible pancreatic mass, lipase was added, patient was admitted to hospital further work-up and management. ED Course as of 05/10/22 1450   Tue May 10, 2022   5272 Hemoccult is positive was not grossly positive, blood pressure improving with fluids, orthostatics were negative [JG]   1028 D-dimer less than 200, PE less likely [JG]   1211 Dr. eVnessa Abarca will call back [JG]   1257 Dr. Venessa Abarca admitted patient, requested surgery be consulted, consulted Dr. Idalia Garza [J]   [id number] 44-year-old male presenting emergency department for loss of consciousness, was walking up to somebody at work and apparently lost consciousness, and some chest pain just prior, blood pressure was soft upon arrival, was given fluids with improvement of blood pressure, orthostatics were negative. Hemoglobin showed a three-point drop, rectal exam checked, was Hemoccult positive, D-dimer less than 200, CT abdomen pelvis showed a possible pancreatic mass, lipase was added, patient was admitted to hospital further work-up and management. [JG]      ED Course User Index  [JG] Cathy Austin MD       --------------------------------------------- PAST HISTORY ---------------------------------------------  Past Medical History:  has no past medical history on file. Past Surgical History:  has a past surgical history that includes Appendectomy. Social History:  reports that he has been smoking cigarettes. He has been smoking about 1.00 pack per day. He has never used smokeless tobacco. He reports previous alcohol use. He reports that he does not use drugs. Family History: family history is not on file. The patients home medications have been reviewed.     Allergies: Patient has no known allergies.     -------------------------------------------------- RESULTS -------------------------------------------------    LABS:  Results for orders placed or performed during the hospital encounter of 05/10/22   CBC with Auto Differential   Result Value Ref Range    WBC 10.6 4.5 - 11.5 E9/L    RBC 3.29 (L) 3.80 - 5.80 E12/L    Hemoglobin 11.1 (L) 12.5 - 16.5 g/dL    Hematocrit 33.0 (L) 37.0 - 54.0 %    .3 (H) 80.0 - 99.9 fL    MCH 33.7 26.0 - 35.0 pg    MCHC 33.6 32.0 - 34.5 %    RDW 13.6 11.5 - 15.0 fL    Platelets 354 786 - 612 E9/L    MPV 10.1 7.0 - 12.0 fL    Neutrophils % 68.0 43.0 - 80.0 %    Immature Granulocytes % 0.5 0.0 - 5.0 %    Lymphocytes % 20.7 20.0 - 42.0 %    Monocytes % 7.3 2.0 - 12.0 %    Eosinophils % 2.6 0.0 - 6.0 %    Basophils % 0.9 0.0 - 2.0 %    Neutrophils Absolute 7.23 1.80 - 7.30 E9/L    Immature Granulocytes # 0.05 E9/L    Lymphocytes Absolute 2.20 1.50 - 4.00 E9/L    Monocytes Absolute 0.78 0.10 - 0.95 E9/L    Eosinophils Absolute 0.28 0.05 - 0.50 E9/L    Basophils Absolute 0.10 0.00 - 0.20 Y8/J   Basic Metabolic Panel w/ Reflex to MG   Result Value Ref Range    Sodium 135 132 - 146 mmol/L    Potassium reflex Magnesium 3.8 3.5 - 5.0 mmol/L    Chloride 105 98 - 107 mmol/L    CO2 19 (L) 22 - 29 mmol/L    Anion Gap 11 7 - 16 mmol/L    Glucose 101 (H) 74 - 99 mg/dL    BUN 45 (H) 6 - 20 mg/dL    CREATININE 1.7 (H) 0.7 - 1.2 mg/dL    GFR Non-African American 43 >=60 mL/min/1.73    GFR African American 52     Calcium 9.3 8.6 - 10.2 mg/dL   Hepatic Function Panel   Result Value Ref Range    Total Protein 7.1 6.4 - 8.3 g/dL    Albumin 4.3 3.5 - 5.2 g/dL    Alkaline Phosphatase 85 40 - 129 U/L    ALT 14 0 - 40 U/L    AST 21 0 - 39 U/L    Total Bilirubin <0.2 0.0 - 1.2 mg/dL    Bilirubin, Direct <0.2 0.0 - 0.3 mg/dL    Bilirubin, Indirect see below 0.0 - 1.0 mg/dL   Troponin   Result Value Ref Range    Troponin, High Sensitivity 14 (H) 0 - 11 ng/L   Brain Natriuretic Peptide   Result Value Ref Range    Pro-BNP 88 0 - 125 pg/mL   D-Dimer, Quantitative   Result Value Ref Range    D-Dimer, Quant <200 ng/mL DDU   Troponin   Result Value Ref Range    Troponin, High Sensitivity 13 (H) 0 - 11 ng/L   Lipase   Result Value Ref Range    Lipase 61 (H) 13 - 60 U/L   EKG 12 Lead   Result Value Ref Range    Ventricular Rate 67 BPM    Atrial Rate 67 BPM    P-R Interval 162 ms    QRS Duration 158 ms    Q-T Interval 430 ms    QTc Calculation (Bazett) 454 ms    P Axis 178 degrees    R Axis 115 degrees    T Axis -173 degrees       RADIOLOGY:  US RETROPERITONEAL COMPLETE   Final Result   1. Bilateral renal cortical thinning. There is no hydronephrosis. 2.  Bladder not well distended for this exam.  No gross abnormality. CT ABDOMEN PELVIS WO CONTRAST Additional Contrast? None   Final Result   1. There is mild prominence or thickening in the distal pancreatic tail, but   no change from January 2022. Considerations include lack of fatty   infiltration, splenic nodule within the pancreas, versus an early pancreatic   mass. Consider pancreas MRI. 2.  No urinary stones or obstruction. 3.  A minimal left abdomen small bowel ileus. 4.  No obvious bowel wall thickening or significant inflammatory change. 5.  Chronic small umbilical hernia containing only fat. RECOMMENDATIONS:   Unavailable         CT Head WO Contrast   Final Result   No acute intracranial abnormality. Specifically, there is no acute   intracranial hemorrhage      Mild mucosal thickening seen within the right and left maxillary sinuses and   ethmoid air cells. XR CHEST PORTABLE   Final Result   No acute process. EKG:  This EKG is signed and interpreted by me.     Rate: 67  Rhythm: Sinus  Interpretation: non-specific EKG  Comparison: changes compared to previous EKG      ------------------------- NURSING NOTES AND VITALS REVIEWED ---------------------------  Date / Time Roomed:  5/10/2022  9:07 AM  ED Bed Assignment:  16/16    The nursing notes within the ED encounter and vital signs as below have been reviewed. Patient Vitals for the past 24 hrs:   BP Temp Temp src Pulse Resp SpO2   05/10/22 0945 (!) 99/52 -- -- 65 -- 99 %   05/10/22 0916 (!) 108/59 98.1 °F (36.7 °C) Oral 70 14 99 %       Oxygen Saturation Interpretation: Normal    ------------------------------------------ PROGRESS NOTES ------------------------------------------    Counseling:  I have spoken with the patient and discussed todays results, in addition to providing specific details for the plan of care and counseling regarding the diagnosis and prognosis. Their questions are answered at this time and they are agreeable with the plan of admission.    --------------------------------- ADDITIONAL PROVIDER NOTES ---------------------------------  Consultations:  Time: 2138. Spoke with Dr. Jeffrey eBar. Discussed case. They will admit the patient. This patient's ED course included: a personal history and physicial examination, re-evaluation prior to disposition, multiple bedside re-evaluations, IV medications, cardiac monitoring and continuous pulse oximetry    This patient has remained hemodynamically stable during their ED course. Diagnosis:  1. Gastrointestinal hemorrhage, unspecified gastrointestinal hemorrhage type    2. Anemia, unspecified type    3. Syncope and collapse    4. LEE (acute kidney injury) ([address]    5. Mass of pancreas        Disposition:  Patient's disposition: Admit to telemetry  Patient's condition is stable.              Zana Muniz MD  Resident  05/10/22 [id number]

## 2022-05-10 NOTE — LETTER
[facility] Internal [facility] [address] [place] [id number]  Phone: [phone]             May 12, 2022    Patient: Svitlana Buchanan   YOB: 1970   Date of Visit: 5/10/2022       To Whom It May Concern:    Svitlana Buchanan was seen and treated in our facility  beginning 5/10/2022 until 5/12/22. He may return to work on 5/16/22.       Sincerely,       Elias López RN         Signature:__________________________________

## 2022-05-11 ENCOUNTER — ANESTHESIA EVENT (OUTPATIENT)
Dept: ENDOSCOPY | Age: 52
DRG: 378 | End: 2022-05-11
Payer: COMMERCIAL

## 2022-05-11 ENCOUNTER — ANESTHESIA (OUTPATIENT)
Dept: ENDOSCOPY | Age: 52
DRG: 378 | End: 2022-05-11
Payer: COMMERCIAL

## 2022-05-11 VITALS
OXYGEN SATURATION: 89 % | DIASTOLIC BLOOD PRESSURE: 77 MMHG | SYSTOLIC BLOOD PRESSURE: 160 MMHG | RESPIRATION RATE: 25 BRPM

## 2022-05-11 LAB
ALBUMIN SERPL-MCNC: 4 G/DL (ref 3.5–5.2)
ALP BLD-CCNC: 73 U/L (ref 40–129)
ALT SERPL-CCNC: 13 U/L (ref 0–40)
ANION GAP SERPL CALCULATED.3IONS-SCNC: 9 MMOL/L (ref 7–16)
AST SERPL-CCNC: 18 U/L (ref 0–39)
BASOPHILS ABSOLUTE: 0.09 E9/L (ref 0–0.2)
BASOPHILS RELATIVE PERCENT: 1 % (ref 0–2)
BILIRUB SERPL-MCNC: 0.3 MG/DL (ref 0–1.2)
BUN BLDV-MCNC: 26 MG/DL (ref 6–20)
CALCIUM SERPL-MCNC: 9 MG/DL (ref 8.6–10.2)
CHLORIDE BLD-SCNC: 108 MMOL/L (ref 98–107)
CO2: 19 MMOL/L (ref 22–29)
CREAT SERPL-MCNC: 1.2 MG/DL (ref 0.7–1.2)
EOSINOPHILS ABSOLUTE: 0.28 E9/L (ref 0.05–0.5)
EOSINOPHILS RELATIVE PERCENT: 3.1 % (ref 0–6)
GFR AFRICAN AMERICAN: >60
GFR NON-AFRICAN AMERICAN: >60 ML/MIN/1.73
GLUCOSE BLD-MCNC: 96 MG/DL (ref 74–99)
HCT VFR BLD CALC: 33.9 % (ref 37–54)
HCT VFR BLD CALC: 35.9 % (ref 37–54)
HEMOGLOBIN: 11.1 G/DL (ref 12.5–16.5)
HEMOGLOBIN: 11.9 G/DL (ref 12.5–16.5)
IMMATURE GRANULOCYTES #: 0.03 E9/L
IMMATURE GRANULOCYTES %: 0.3 % (ref 0–5)
LYMPHOCYTES ABSOLUTE: 1.82 E9/L (ref 1.5–4)
LYMPHOCYTES RELATIVE PERCENT: 20.4 % (ref 20–42)
MCH RBC QN AUTO: 32.2 PG (ref 26–35)
MCHC RBC AUTO-ENTMCNC: 32.7 % (ref 32–34.5)
MCV RBC AUTO: 98.3 FL (ref 80–99.9)
MONOCYTES ABSOLUTE: 0.68 E9/L (ref 0.1–0.95)
MONOCYTES RELATIVE PERCENT: 7.6 % (ref 2–12)
NEUTROPHILS ABSOLUTE: 6.02 E9/L (ref 1.8–7.3)
NEUTROPHILS RELATIVE PERCENT: 67.6 % (ref 43–80)
PDW BLD-RTO: 13.6 FL (ref 11.5–15)
PLATELET # BLD: 233 E9/L (ref 130–450)
PMV BLD AUTO: 10.3 FL (ref 7–12)
POTASSIUM REFLEX MAGNESIUM: 4 MMOL/L (ref 3.5–5)
RBC # BLD: 3.45 E12/L (ref 3.8–5.8)
SODIUM BLD-SCNC: 136 MMOL/L (ref 132–146)
TOTAL PROTEIN: 6.6 G/DL (ref 6.4–8.3)
WBC # BLD: 8.9 E9/L (ref 4.5–11.5)

## 2022-05-11 PROCEDURE — 85014 HEMATOCRIT: CPT

## 2022-05-11 PROCEDURE — 2709999900 HC NON-CHARGEABLE SUPPLY: Performed by: STUDENT IN AN ORGANIZED HEALTH CARE EDUCATION/TRAINING PROGRAM

## 2022-05-11 PROCEDURE — 3700000001 HC ADD 15 MINUTES (ANESTHESIA): Performed by: STUDENT IN AN ORGANIZED HEALTH CARE EDUCATION/TRAINING PROGRAM

## 2022-05-11 PROCEDURE — 85025 COMPLETE CBC W/AUTO DIFF WBC: CPT

## 2022-05-11 PROCEDURE — 6360000002 HC RX W HCPCS: Performed by: STUDENT IN AN ORGANIZED HEALTH CARE EDUCATION/TRAINING PROGRAM

## 2022-05-11 PROCEDURE — 6360000002 HC RX W HCPCS: Performed by: NURSE ANESTHETIST, CERTIFIED REGISTERED

## 2022-05-11 PROCEDURE — 3700000000 HC ANESTHESIA ATTENDED CARE: Performed by: STUDENT IN AN ORGANIZED HEALTH CARE EDUCATION/TRAINING PROGRAM

## 2022-05-11 PROCEDURE — 2580000003 HC RX 258: Performed by: NURSE ANESTHETIST, CERTIFIED REGISTERED

## 2022-05-11 PROCEDURE — 85018 HEMOGLOBIN: CPT

## 2022-05-11 PROCEDURE — 36415 COLL VENOUS BLD VENIPUNCTURE: CPT

## 2022-05-11 PROCEDURE — 7100000011 HC PHASE II RECOVERY - ADDTL 15 MIN: Performed by: STUDENT IN AN ORGANIZED HEALTH CARE EDUCATION/TRAINING PROGRAM

## 2022-05-11 PROCEDURE — 6370000000 HC RX 637 (ALT 250 FOR IP): Performed by: INTERNAL MEDICINE

## 2022-05-11 PROCEDURE — 88305 TISSUE EXAM BY PATHOLOGIST: CPT

## 2022-05-11 PROCEDURE — 2060000000 HC ICU INTERMEDIATE R&B

## 2022-05-11 PROCEDURE — 0DB68ZX EXCISION OF STOMACH, VIA NATURAL OR ARTIFICIAL OPENING ENDOSCOPIC, DIAGNOSTIC: ICD-10-PCS | Performed by: STUDENT IN AN ORGANIZED HEALTH CARE EDUCATION/TRAINING PROGRAM

## 2022-05-11 PROCEDURE — 7100000010 HC PHASE II RECOVERY - FIRST 15 MIN: Performed by: STUDENT IN AN ORGANIZED HEALTH CARE EDUCATION/TRAINING PROGRAM

## 2022-05-11 PROCEDURE — 80053 COMPREHEN METABOLIC PANEL: CPT

## 2022-05-11 PROCEDURE — C9113 INJ PANTOPRAZOLE SODIUM, VIA: HCPCS | Performed by: STUDENT IN AN ORGANIZED HEALTH CARE EDUCATION/TRAINING PROGRAM

## 2022-05-11 PROCEDURE — 99222 1ST HOSP IP/OBS MODERATE 55: CPT | Performed by: INTERNAL MEDICINE

## 2022-05-11 PROCEDURE — 6370000000 HC RX 637 (ALT 250 FOR IP): Performed by: STUDENT IN AN ORGANIZED HEALTH CARE EDUCATION/TRAINING PROGRAM

## 2022-05-11 PROCEDURE — 3609012400 HC EGD TRANSORAL BIOPSY SINGLE/MULTIPLE: Performed by: STUDENT IN AN ORGANIZED HEALTH CARE EDUCATION/TRAINING PROGRAM

## 2022-05-11 PROCEDURE — 88342 IMHCHEM/IMCYTCHM 1ST ANTB: CPT

## 2022-05-11 PROCEDURE — 2580000003 HC RX 258: Performed by: STUDENT IN AN ORGANIZED HEALTH CARE EDUCATION/TRAINING PROGRAM

## 2022-05-11 RX ORDER — ATORVASTATIN CALCIUM 40 MG/1
40 TABLET, FILM COATED ORAL NIGHTLY
Status: DISCONTINUED | OUTPATIENT
Start: 2022-05-11 | End: 2022-05-12 | Stop reason: HOSPADM

## 2022-05-11 RX ORDER — HYDRALAZINE HYDROCHLORIDE 50 MG/1
100 TABLET, FILM COATED ORAL EVERY 8 HOURS SCHEDULED
Status: DISCONTINUED | OUTPATIENT
Start: 2022-05-11 | End: 2022-05-12 | Stop reason: HOSPADM

## 2022-05-11 RX ORDER — CLONIDINE HYDROCHLORIDE 0.1 MG/1
0.1 TABLET ORAL 2 TIMES DAILY
Status: DISCONTINUED | OUTPATIENT
Start: 2022-05-11 | End: 2022-05-12 | Stop reason: HOSPADM

## 2022-05-11 RX ORDER — AMLODIPINE BESYLATE 10 MG/1
10 TABLET ORAL DAILY
Status: DISCONTINUED | OUTPATIENT
Start: 2022-05-11 | End: 2022-05-12 | Stop reason: HOSPADM

## 2022-05-11 RX ORDER — SODIUM CHLORIDE 9 MG/ML
INJECTION, SOLUTION INTRAVENOUS CONTINUOUS PRN
Status: DISCONTINUED | OUTPATIENT
Start: 2022-05-11 | End: 2022-05-11 | Stop reason: SDUPTHER

## 2022-05-11 RX ORDER — PROPOFOL 10 MG/ML
INJECTION, EMULSION INTRAVENOUS PRN
Status: DISCONTINUED | OUTPATIENT
Start: 2022-05-11 | End: 2022-05-11 | Stop reason: SDUPTHER

## 2022-05-11 RX ORDER — LISINOPRIL 20 MG/1
20 TABLET ORAL DAILY
Status: DISCONTINUED | OUTPATIENT
Start: 2022-05-11 | End: 2022-05-12 | Stop reason: HOSPADM

## 2022-05-11 RX ADMIN — SUCRALFATE 1 G: 1 TABLET ORAL at 06:22

## 2022-05-11 RX ADMIN — CLONIDINE HYDROCHLORIDE 0.1 MG: 0.1 TABLET ORAL at 12:33

## 2022-05-11 RX ADMIN — CLONIDINE HYDROCHLORIDE 0.1 MG: 0.1 TABLET ORAL at 20:40

## 2022-05-11 RX ADMIN — SODIUM CHLORIDE: 9 INJECTION, SOLUTION INTRAVENOUS at 08:02

## 2022-05-11 RX ADMIN — AMLODIPINE BESYLATE 10 MG: 10 TABLET ORAL at 12:33

## 2022-05-11 RX ADMIN — PANTOPRAZOLE SODIUM 40 MG: 40 INJECTION, POWDER, FOR SOLUTION INTRAVENOUS at 08:14

## 2022-05-11 RX ADMIN — HYDRALAZINE HYDROCHLORIDE 100 MG: 50 TABLET, FILM COATED ORAL at 15:33

## 2022-05-11 RX ADMIN — PROPOFOL 250 MG: 10 INJECTION, EMULSION INTRAVENOUS at 09:00

## 2022-05-11 RX ADMIN — LISINOPRIL 20 MG: 20 TABLET ORAL at 12:33

## 2022-05-11 RX ADMIN — SUCRALFATE 1 G: 1 TABLET ORAL at 20:40

## 2022-05-11 RX ADMIN — SUCRALFATE 1 G: 1 TABLET ORAL at 17:38

## 2022-05-11 RX ADMIN — HYDRALAZINE HYDROCHLORIDE 100 MG: 50 TABLET, FILM COATED ORAL at 20:40

## 2022-05-11 RX ADMIN — SODIUM CHLORIDE, PRESERVATIVE FREE 10 ML: 5 INJECTION INTRAVENOUS at 08:19

## 2022-05-11 RX ADMIN — ATORVASTATIN CALCIUM 40 MG: 40 TABLET, FILM COATED ORAL at 20:40

## 2022-05-11 RX ADMIN — SUCRALFATE 1 G: 1 TABLET ORAL at 11:36

## 2022-05-11 RX ADMIN — SODIUM CHLORIDE: 9 INJECTION, SOLUTION INTRAVENOUS at 17:38

## 2022-05-11 RX ADMIN — SODIUM CHLORIDE, PRESERVATIVE FREE 10 ML: 5 INJECTION INTRAVENOUS at 20:41

## 2022-05-11 RX ADMIN — PANTOPRAZOLE SODIUM 40 MG: 40 INJECTION, POWDER, FOR SOLUTION INTRAVENOUS at 20:40

## 2022-05-11 ASSESSMENT — LIFESTYLE VARIABLES: SMOKING_STATUS: 1

## 2022-05-11 NOTE — PROGRESS NOTES
Internal Medicine Progress Note    Patient's name: Jinny Da Silva  : 1970  Chief complaints (on day of admission): Loss of Consciousness  Admission date: 5/10/2022  Date of service: 2022   Room: [address]  Primary care physician: Acacia Alvarado MD  Reason for visit: Follow-up for LOC     Subjective  Veda Vallecillo was seen and examined at bedside     Doing well today   Seen after EGD   Discussed findings with Dr Helen Randall patient and family   Antral gastritis   Again discussed cessation of tobacco and nsaids   Discussed awaiting cardiology evaluation today for his episode of syncope     Current treatment plan discussed and all questions answered     Current medications being prescribed discussed and patient expresses verbal understanding     Review of Systems  There are no new complaints of chest pain, shortness of breath, abdominal pain, nausea, vomiting, diarrhea, constipation unless otherwise mentioned above.      Hospital Medications  Current Facility-Administered Medications   Medication Dose Route Frequency Provider Last Rate Last Admin    pantoprazole (PROTONIX) injection 40 mg  40 mg IntraVENous BID Kathleen Prescott MD   40 mg at 05/10/22 2038    0.9 % sodium chloride infusion   IntraVENous Continuous Kathleen Prescott  mL/hr at 05/10/22 1540 New Bag at 05/10/22 1540    sodium chloride flush 0.9 % injection 10 mL  10 mL IntraVENous 2 times per day Kathleen Prescott MD   10 mL at 05/10/22 2038    sodium chloride flush 0.9 % injection 10 mL  10 mL IntraVENous PRN Kathleen Prescott MD        0.9 % sodium chloride infusion   IntraVENous PRN Kathleen Prescott MD        potassium chloride (KLOR-CON M) extended release tablet 40 mEq  40 mEq Oral PRN Kathleen Prescott MD        Or    potassium bicarb-citric acid (EFFER-K) effervescent tablet 40 mEq  40 mEq Oral PRN Kathleen Prescott MD        Or    potassium chloride 10 mEq/100 mL IVPB (Peripheral Line)  10 mEq IntraVENous PRN MD Gerald Kimball ondansetron (ZOFRAN-ODT) disintegrating tablet 4 mg  4 mg Oral Q8H PRN Frances Jay MD        Or    ondansetron [facility]) injection 4 mg  4 mg IntraVENous Q6H PRN Frances Jay MD        senna [facility]) tablet 8.6 mg  1 tablet Oral Daily PRN Frances Jay MD        acetaminophen (TYLENOL) tablet 650 mg  650 mg Oral Q6H PRN Frances Jay MD        Or    acetaminophen (TYLENOL) suppository 650 mg  650 mg Rectal Q6H PRN Frances Jay MD        hydrALAZINE (APRESOLINE) injection 10 mg  10 mg IntraVENous Q6H PRN Frances Jay MD        melatonin tablet 3 mg  3 mg Oral Nightly PRN Frances Jay MD        sucralfate (CARAFATE) tablet 1 g  1 g Oral 4x Daily AC & HS Candace Baker MD   1 g at 05/11/22 0622       PRN Medications  sodium chloride flush, sodium chloride, potassium chloride **OR** potassium alternative oral replacement **OR** potassium chloride, ondansetron **OR** ondansetron, senna, acetaminophen **OR** acetaminophen, hydrALAZINE, melatonin    Objective  Most Recent Recorded Vitals  BP (!) 124/58   Pulse 69   Temp 97.9 °F (36.6 °C) (Oral)   Resp 18   Wt 248 lb (112.5 kg)   SpO2 98%   BMI 34.59 kg/m²   No intake/output data recorded. No intake/output data recorded.     Physical Exam:  General: AAO to person/place/time/purpose, NAD, no labored breathing  Eyes: conjunctivae/corneas clear, sclera non icteric  Skin: color/texture/turgor normal, no rashes or lesions  Lungs: CTAB, no retractions/use of accessory muscles, no vocal fremitus, no rhonchi, no crackle, no rales  Heart: regular rate, regular rhythm, no murmur  Abdomen: soft, NT, bowel sounds normal  Extremities: atraumatic, no edema  Neurologic: cranial nerves 2-12 grossly intact, no slurred speech    Most Recent Labs  Lab Results   Component Value Date    WBC 8.9 05/11/2022    HGB 11.1 (L) 05/11/2022    HCT 33.9 (L) 05/11/2022     05/11/2022     05/11/2022    K 4.0 05/11/2022     (H) 05/11/2022    CREATININE 1.2 05/11/2022    BUN 26 (H) 05/11/2022    CO2 19 (L) 05/11/2022    GLUCOSE 96 05/11/2022    ALT 13 05/11/2022    AST 18 05/11/2022    TSH 1.310 01/16/2022    LABA1C 5.3 01/16/2022       US RETROPERITONEAL COMPLETE   Final Result   1. Bilateral renal cortical thinning. There is no hydronephrosis. 2.  Bladder not well distended for this exam.  No gross abnormality. CT ABDOMEN PELVIS WO CONTRAST Additional Contrast? None   Final Result   1. There is mild prominence or thickening in the distal pancreatic tail, but   no change from January 2022. Considerations include lack of fatty   infiltration, splenic nodule within the pancreas, versus an early pancreatic   mass. Consider pancreas MRI. 2.  No urinary stones or obstruction. 3.  A minimal left abdomen small bowel ileus. 4.  No obvious bowel wall thickening or significant inflammatory change. 5.  Chronic small umbilical hernia containing only fat. RECOMMENDATIONS:   Unavailable         CT Head WO Contrast   Final Result   No acute intracranial abnormality. Specifically, there is no acute   intracranial hemorrhage      Mild mucosal thickening seen within the right and left maxillary sinuses and   ethmoid air cells. XR CHEST PORTABLE   Final Result   No acute process. Echocardiogram       Assessment   Active Hospital Problems    Diagnosis     GI bleed [K92.2]      Priority: Medium    Benign essential HTN [I10]      Priority: Medium         Plan  · GI bleeding   ? General surgery consult for EGD   ? FOBT + in ED   ? EGD 10/11 Antral Gastritis   ? PPI   ? Outpatient colonoscopy with Dr Gutierrez Ridley   · Syncope with hypotension   ? Cardiology consultation   ? Recent ECHO reviewed   ? Significant cardiac risk factors   ? Had chest pain prior to event   ? Telemetry   ? IVF hydration   ?  Will need to adjust home anti htn rx   · PT AM-PAC-- Independent   · Consults Gen surge, cardio   · DVT prophylaxis   · Code status Full   · Medications, labs and imaging reviewed   · Discharge plan: Tomorrow morning if cleared by cardiology     Electronically signed by Josiane Washburn MD on 5/11/2022 at 7:27 AM    I can be reached through [address] [place] [address].

## 2022-05-11 NOTE — PROGRESS NOTES
Nursing Transfer Note    Data:  Summary of patients progress: EGD   Reason for transfer: continuation of care    Action:  Explained reason for transfer to patient. Family not present in waiting room  Report given to: RN, using RN Handoff Navigator. Mode of transportation: cart    Response:  RN Recommendations: follow post op orders    CMR verified visualization of rhythm .

## 2022-05-11 NOTE — CONSULTS
CHIEF COMPLAINT: Syncope    HISTORY OF PRESENT ILLNESS: Patient is a 54 y.o. male seen at the request of Darell Johnson MD and followed at our office by Dr. Kaylynn Jaimes.      Patient, who works construction, was at work and passed out. Patient was standing. No prodromal symptoms. No CP or SOB. Evaluated and found to be severely pre-renal.     Past Medical History:    1. Hx of Appendectomy   2. Current smoker: 1 PPD x 30 pack years   3. ETOH use: 2-3 beers/day. 4. Hx of right wrist injury. 5. Hx of COVID-19 infection (home test) / Unvaccinated.    6. Probable YINA    Patient Active Problem List   Diagnosis    Hypertensive emergency    GI bleed    Benign essential HTN       No Known Allergies    Current Facility-Administered Medications   Medication Dose Route Frequency Provider Last Rate Last Admin    pantoprazole (PROTONIX) injection 40 mg  40 mg IntraVENous BID Ayaka Guevara MD   40 mg at 05/10/22 2038    0.9 % sodium chloride infusion   IntraVENous Continuous Ayaka Guevara  mL/hr at 05/10/22 1540 New Bag at 05/10/22 1540    sodium chloride flush 0.9 % injection 10 mL  10 mL IntraVENous 2 times per day Ayaka Guevara MD   10 mL at 05/10/22 2038    sodium chloride flush 0.9 % injection 10 mL  10 mL IntraVENous PRN Ayaka Guevara MD        0.9 % sodium chloride infusion   IntraVENous PRN Ayaka Guevara MD        potassium chloride (KLOR-CON M) extended release tablet 40 mEq  40 mEq Oral PRN Ayaka Guevara MD        Or    potassium bicarb-citric acid (EFFER-K) effervescent tablet 40 mEq  40 mEq Oral PRN Ayaka Guevara MD        Or    potassium chloride 10 mEq/100 mL IVPB (Peripheral Line)  10 mEq IntraVENous PRN Ayaka Guevara MD        ondansetron (ZOFRAN-ODT) disintegrating tablet 4 mg  4 mg Oral Q8H PRN Ayaka Guevara MD        Or    ondansetron [facility]) injection 4 mg  4 mg IntraVENous Q6H PRN Ayaka Guevara MD        [facility]) tablet 8.6 mg  1 tablet Oral Daily PRN Ayaka Guevara MD  acetaminophen (TYLENOL) tablet 650 mg  650 mg Oral Q6H PRN Ayaka Guevara MD        Or    acetaminophen (TYLENOL) suppository 650 mg  650 mg Rectal Q6H PRN Ayaka Guevara MD        hydrALAZINE (APRESOLINE) injection 10 mg  10 mg IntraVENous Q6H PRN Ayaka Guevara MD        melatonin tablet 3 mg  3 mg Oral Nightly PRN Ayaka Guevara MD        sucralfate (CARAFATE) tablet 1 g  1 g Oral 4x Daily AC & HS Travis Velasquez MD   1 g at 05/11/22 0622       Social History     Socioeconomic History    Marital status:      Spouse name: Not on file    Number of children: Not on file    Years of education: Not on file    Highest education level: Not on file   Occupational History    Not on file   Tobacco Use    Smoking status: Current Every Day Smoker     Packs/day: 1.00     Types: Cigarettes    Smokeless tobacco: Never Used   Vaping Use    Vaping Use: Never used   Substance and Sexual Activity    Alcohol use: Not Currently     Comment: daily    Drug use: Never    Sexual activity: Not on file   Other Topics Concern    Not on file   Social History Narrative    Not on file     Social Determinants of Health     Financial Resource Strain:     Difficulty of Paying Living Expenses: Not on file   Food Insecurity:     Worried About Running Out of Food in the Last Year: Not on file    Mukesh of Food in the Last Year: Not on file   Transportation Needs:     Lack of Transportation (Medical): Not on file    Lack of Transportation (Non-Medical):  Not on file   Physical Activity:     Days of Exercise per Week: Not on file    Minutes of Exercise per Session: Not on file   Stress:     Feeling of Stress : Not on file   Social Connections:     Frequency of Communication with Friends and Family: Not on file    Frequency of Social Gatherings with Friends and Family: Not on file    Attends [employer] Services: Not on file    Active Member of Clubs or Organizations: Not on file    Attends Club or Organization Meetings: Not on file    Marital Status: Not on file   Intimate Partner Violence:     Fear of Current or Ex-Partner: Not on file    Emotionally Abused: Not on file    Physically Abused: Not on file    Sexually Abused: Not on file   Housing Stability:     Unable to Pay for Housing in the Last Year: Not on file    Number of [place] in the Last Year: Not on file    Unstable Housing in the Last Year: Not on file       No family history on file. Review of Systems:   Heart: as above   Lungs: as above   Eyes: denies changes in vision or discharge. Ears: denies changes in hearing or pain. Nose: denies epistaxis or masses   Throat: denies sore throat or trouble swallowing. Neuro: denies numbness, tingling, tremors. Skin: denies rashes or itching. : denies hematuria, dysuria   GI: denies vomiting, diarrhea   Psych: denies mood changed, anxiety, depression. all others negative. Physical Exam   BP (!) 124/58   Pulse 69   Temp 97.9 °F (36.6 °C) (Oral)   Resp 18   Wt 248 lb (112.5 kg)   SpO2 98%   BMI 34.59 kg/m²   Constitutional: Oriented to person, place, and time. Well-developed and well-nourished. No distress. Head: Normocephalic and atraumatic. Eyes: EOM are normal. Pupils are equal, round, and reactive to light. Neck: Normal range of motion. Neck supple. No hepatojugular reflux and no JVD present. Carotid bruit is not present. No tracheal deviation present. No thyromegaly present. Cardiovascular: Normal rate, regular rhythm, normal heart sounds and intact distal pulses. Exam reveals no gallop and no friction rub. No murmur heard. Pulmonary/Chest: Effort normal and breath sounds normal. No respiratory distress. No wheezes. No rales. No tenderness. Abdominal: Soft. Bowel sounds are normal. No distension and no mass. No tenderness. No rebound and no guarding. Musculoskeletal: Normal range of motion. No edema and no tenderness. Lymphadenopathy:   No cervical adenopathy.  No groin adenopathy. Neurological: Alert and oriented to person, place, and time. Skin: Skin is warm and dry. No rash noted. Not diaphoretic. No erythema. Psychiatric: Normal mood and affect. Behavior is normal.     CBC:   Lab Results   Component Value Date    WBC 8.9 05/11/2022    RBC 3.45 05/11/2022    HGB 11.1 05/11/2022    HCT 33.9 05/11/2022    MCV 98.3 05/11/2022    MCH 32.2 05/11/2022    MCHC 32.7 05/11/2022    RDW 13.6 05/11/2022     05/11/2022    MPV 10.3 05/11/2022     BMP:   Lab Results   Component Value Date     05/11/2022    K 4.0 05/11/2022     05/11/2022    CO2 19 05/11/2022    BUN 26 05/11/2022    LABALBU 4.0 05/11/2022    CREATININE 1.2 05/11/2022    CALCIUM 9.0 05/11/2022    GFRAA >60 05/11/2022    LABGLOM >60 05/11/2022     Magnesium:    Lab Results   Component Value Date    MG 2.3 01/19/2022     Cardiac Enzymes:   Lab Results   Component Value Date    TROPHS 13 (H) 05/10/2022    TROPHS 14 (H) 05/10/2022    TROPHS 19 (H) 01/15/2022      PT/INR:  No results found for: PROTIME, INR  TSH:    Lab Results   Component Value Date    TSH 1.310 01/16/2022     Rhythm Strip: normal sinus rhythm. EKG:  normal sinus rhythm, nonspecific ST and T waves changes, RBBB. Echocardiogram reviewed: 1/16/22   Normal left ventricular systolic function.   Ejection fraction is visually estimated at 60%.  Moderate concentric left ventricular hypertrophy.   Dilated right ventricle and normal right ventricular function (TAPSE 2.9 cm).   There is doppler evidence of stage II diastolic dysfunction.   No evidence of hemodynamically significant valve disease.   Unable to estimate PASP due to incomplete tricuspid regurgitation envelope. US renal arteries: 1/18/22  1.  Bilateral renal artery stenosis.  Peak velocity in the right renal artery   was 295 centimeters/second and peak velocity in the left renal artery was 269   centimeters/second.   RAR on the right was 2.8 and RAR on the left was 2.6.     2.  Both renal veins appear patent.  Otherwise normal appearance of the   kidneys.  No hydronephrosis.  Normal appearance of the imaged bladder.       RECOMMENDATIONS:   Recommend screening CTA of the bilateral renal arteries. CTA abdomen/pelvis: 1/19/22  1. No significant renal artery stenosis identified   2. 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 Syncope:     EKG/labs/chart reviewed. Echo 1/2022 reviewed. Appears to be significantly dehydrated on admission. IV hydration. Stop HCTZ. Titrate other medications. Monitor while here. Outpatient monitor after discharge. 2. Prior Episodes of 2:1 AV block/RBBB:    Documented during 1/2022 hospitalization. Avoid AV rohith blocking agents    Monitor as outpatient. 3. Diastolic dysfunction/LVH: Treat HTN. 4. LEE: Improving. 5. HTN: Observe. 6. Lipids: Statin. 7. Probable YINA    8. Ongoing tobacco abuse    9. Prior ETOH use     10. Abnormal CT: Pancreatic findings. Per primary service. 11. Anemia: Follow labs. 12. GIB: Per primary service. Hang Garcia D.O.   Cardiologist  Cardiology, [address] [facility]

## 2022-05-11 NOTE — ANESTHESIA PRE PROCEDURE
Department of Anesthesiology  Preprocedure Note       Name:  John Treviño   Age:  54 y.o.  :  1970                                          MRN:  [id number]         Date:  2022      Surgeon: Alla Ngo):  Liza Neumann MD    Procedure: Procedure(s):  EGD ESOPHAGOGASTRODUODENOSCOPY    Medications prior to admission:   Prior to Admission medications    Medication Sig Start Date End Date Taking?  Authorizing Provider   amLODIPine (NORVASC) 10 MG tablet Take 10 mg by mouth every morning   Yes Historical Provider, MD   hydroCHLOROthiazide (HYDRODIURIL) 25 MG tablet Take 25 mg by mouth every morning   Yes Historical Provider, MD   lisinopril (PRINIVIL;ZESTRIL) 20 MG tablet Take 20 mg by mouth every morning   Yes Historical Provider, MD   atorvastatin (LIPITOR) 40 MG tablet Take 1 tablet by mouth nightly 22   Mary Ordoñez DO   cloNIDine (CATAPRES) 0.1 MG tablet Take 1 tablet by mouth 2 times daily 22   Mary Ordoñez DO   hydrALAZINE (APRESOLINE) 100 MG tablet Take 1 tablet by mouth 3 times daily 22   Shaina Martin DO       Current medications:    Current Facility-Administered Medications   Medication Dose Route Frequency Provider Last Rate Last Admin    pantoprazole (PROTONIX) injection 40 mg  40 mg IntraVENous BID Mar Echevarria MD   40 mg at 22 0814    0.9 % sodium chloride infusion   IntraVENous Continuous Mar Echevarria  mL/hr at 05/10/22 1540 New Bag at 05/10/22 1540    sodium chloride flush 0.9 % injection 10 mL  10 mL IntraVENous 2 times per day Mar Echevarria MD   10 mL at 22 0819    sodium chloride flush 0.9 % injection 10 mL  10 mL IntraVENous PRN Mar Echevarria MD        0.9 % sodium chloride infusion   IntraVENous PRN Mar Echevarria MD        potassium chloride (KLOR-CON M) extended release tablet 40 mEq  40 mEq Oral PRN Mar Echevarria MD        Or    potassium bicarb-citric acid (EFFER-K) effervescent tablet 40 mEq  40 mEq Oral PRN Mar Echevarria MD        Or  potassium chloride 10 mEq/100 mL IVPB (Peripheral Line)  10 mEq IntraVENous PRN Baljeet Diamond MD        ondansetron (ZOFRAN-ODT) disintegrating tablet 4 mg  4 mg Oral Q8H PRN Baljeet Diamond MD        Or    [facility]dansetron [facility]) injection 4 mg  4 mg IntraVENous Q6H PRN Baljeet Diamond MD        [facility]) tablet 8.6 mg  1 tablet Oral Daily PRN Baljeet Diamond MD        acetaminophen (TYLENOL) tablet 650 mg  650 mg Oral Q6H PRN Baljeet Diamond MD        Or    acetaminophen (TYLENOL) suppository 650 mg  650 mg Rectal Q6H PRN Baljeet Diamond MD        hydrALAZINE (APRESOLINE) injection 10 mg  10 mg IntraVENous Q6H PRN Baljeet Diamond MD        melatonin tablet 3 mg  3 mg Oral Nightly PRN Baljeet Diamond MD        sucralfate (CARAFATE) tablet 1 g  1 g Oral 4x Daily AC & HS Gayla Teixeira MD   1 g at 05/11/22 5696       Allergies:  No Known Allergies    Problem List:    Patient Active Problem List   Diagnosis Code    Hypertensive emergency I16.1    GI bleed K92.2    Benign essential HTN I10       Past Medical History:  No past medical history on file.     Past Surgical History:        Procedure Laterality Date    APPENDECTOMY         Social History:    Social History     Tobacco Use    Smoking status: Current Every Day Smoker     Packs/day: 1.00     Types: Cigarettes    Smokeless tobacco: Never Used   Substance Use Topics    Alcohol use: Not Currently     Comment: daily                                Ready to quit: Not Answered  Counseling given: Not Answered      Vital Signs (Current):   Vitals:    05/10/22 2030 05/11/22 0615 05/11/22 0621 05/11/22 0800   BP: 114/68 (!) 124/58  135/75   Pulse: 56 69  63   Resp: 16 18  18   Temp: 97.9 °F (36.6 °C)   97.9 °F (36.6 °C)   TempSrc: Oral   Oral   SpO2: 100% 98%  100%   Weight:   248 lb (112.5 kg)                                               BP Readings from Last 3 Encounters:   05/11/22 135/75   02/22/22 110/60   01/19/22 (!) 141/83       NPO Status: BMI:   Wt Readings from Last 3 Encounters:   05/11/22 248 lb (112.5 kg)   02/22/22 248 lb 9.6 oz (112.8 kg)   01/15/22 233 lb 4 oz (105.8 kg)     Body mass index is 34.59 kg/m². CBC:   Lab Results   Component Value Date    WBC 8.9 05/11/2022    RBC 3.45 05/11/2022    HGB 11.1 05/11/2022    HCT 33.9 05/11/2022    MCV 98.3 05/11/2022    RDW 13.6 05/11/2022     05/11/2022       CMP:   Lab Results   Component Value Date     05/11/2022    K 4.0 05/11/2022     05/11/2022    CO2 19 05/11/2022    BUN 26 05/11/2022    CREATININE 1.2 05/11/2022    GFRAA >60 05/11/2022    LABGLOM >60 05/11/2022    GLUCOSE 96 05/11/2022    PROT 6.6 05/11/2022    CALCIUM 9.0 05/11/2022    BILITOT 0.3 05/11/2022    ALKPHOS 73 05/11/2022    AST 18 05/11/2022    ALT 13 05/11/2022       POC Tests: No results for input(s): POCGLU, POCNA, POCK, POCCL, POCBUN, POCHEMO, POCHCT in the last 72 hours. Coags: No results found for: PROTIME, INR, APTT    HCG (If Applicable): No results found for: PREGTESTUR, PREGSERUM, HCG, HCGQUANT     ABGs: No results found for: PHART, PO2ART, GAO9UAY, ZGO2SML, BEART, T8VBJPFW     Type & Screen (If Applicable):  No results found for: LABABO, LABRH    Drug/Infectious Status (If Applicable):  No results found for: HIV, HEPCAB    COVID-19 Screening (If Applicable):   Lab Results   Component Value Date    COVID19 Not Detected 01/15/2022           Anesthesia Evaluation  Patient summary reviewed and Nursing notes reviewed no history of anesthetic complications:   Airway: Mallampati: III  TM distance: >3 FB   Neck ROM: full  Mouth opening: > = 3 FB Dental:          Pulmonary:normal exam    (+) current smoker          Patient did not smoke on day of surgery.                  Cardiovascular:    (+) hypertension:,                   Neuro/Psych:   Negative Neuro/Psych ROS              GI/Hepatic/Renal: Neg GI/Hepatic/Renal ROS Endo/Other: Negative Endo/Other ROS                    Abdominal:             Vascular: negative vascular ROS. Other Findings:             Anesthesia Plan      MAC     ASA 2       Induction: intravenous. Anesthetic plan and risks discussed with patient. Plan discussed with CRNA.                   Kacey Thao MD   5/11/2022

## 2022-05-11 NOTE — CARE COORDINATION
Pt off unit in endo. Met w/ wife and pt's mother. Explained role of  and plan of care. Patient and wife live in a 2 story house- 1 step to entrance. Independent PTA. No Hx DMEs, HHC, or PAPITO. PCP is Dr. Tomer Wright and pharmacy is [address] [place] [address]. PT/OT evals pending. Per wife, plan is for pt to return home on discharge- wife or mother will provide transportation- no needs.  Will follow Kathleen Alarcon RN case manager

## 2022-05-11 NOTE — PROGRESS NOTES
Occupational Therapy    OT eval and treat orders received and chart reviewed. Attempt made but pt reports he is able to complete own self care and up independently in room. No acute OT needs at this time. OT orders discontinued.     Isaiah Cardona, OTR/L

## 2022-05-11 NOTE — ANESTHESIA POSTPROCEDURE EVALUATION
Department of Anesthesiology  Postprocedure Note    Patient: Vanessa Nina  MRN: [id number]  YOB: 1970  Date of evaluation: 5/11/2022  Time:  9:10 AM     Procedure Summary     Date: 05/11/22 Room / Location: [address] [place] [address]    Anesthesia Start: 7586 Anesthesia Stop: 1021    Procedure: EGD BIOPSY (N/A ) Diagnosis: (/)    Surgeons: Nixon Ruffin MD Responsible Provider: Kacey Thao MD    Anesthesia Type: MAC ASA Status: 2          Anesthesia Type: No value filed. Daniel Phase I:      Daniel Phase II:      Last vitals: Reviewed and per EMR flowsheets.        Anesthesia Post Evaluation    Patient location during evaluation: bedside  Patient participation: complete - patient participated  Level of consciousness: awake and alert  Airway patency: patent  Nausea & Vomiting: no nausea and no vomiting  Complications: no  Cardiovascular status: blood pressure returned to baseline  Respiratory status: acceptable  Hydration status: euvolemic

## 2022-05-11 NOTE — OP NOTE
Operative Note      Patient: Yue Choe  YOB: 1970  MRN: [id number]    Date of Procedure: 5/11/2022    Pre-Op Diagnosis: GI bleed    Post-Op Diagnosis: Antral gastritis       Procedure(s):  EGD BIOPSY    Surgeon(s):  Twanda Goldmann, MD    Assistant:   * No surgical staff found *    Anesthesia: Monitor Anesthesia Care    Estimated Blood Loss (mL): Minimal    Complications: None    Specimens:   ID Type Source Tests Collected by Time Destination   A : ANTRAL BX Tissue Stomach SURGICAL PATHOLOGY Twanda Goldmann, MD 5/11/2022 6050        Implants:  * No implants in log *      Drains: * No LDAs found *    Findings: Antral gastritis, regular Z-line, no esophageal varices, normal duodenum    Detailed Description of Procedure:   After risk benefits and alternatives were discussed with the patient patient was brought to the endoscopy suite and laid in the left semi-Harris position anesthesia was induced a timeout was conducted. Following this a well-lubricated endoscope was inserted in the patient's oropharynx down into the esophagus and traversed down into the stomach. Gas was insufflated. the duodenum was evaluated as well. The above findings were noted. At this time antral biopsies were obtained to evaluate for pathology as well as for H. pylori. Following this the gas was desufflated and the scope was removed from the patient's upper GI tract. Patient was awake from anesthesia and sent to recovery in stable condition. Disposition: We will plan for outpatient colonoscopy. His H&H is stable at this time. If in-house long can consider colonoscopy as an inpatient.     Electronically signed by Twanda Goldmann, MD on 5/11/2022 at 9:10 AM

## 2022-05-11 NOTE — PROGRESS NOTES
Physical Therapy  Facility/Department: [address] INTERNAL MEDICINE 2    Name: Lesly Paul  : 1970  MRN: [id number]       Order received, chart reviewed and eval attempted this pm.  Pt reported he is independent with all functional mobility around his room and does not need PT while in the hospital.  Will discontinue PT order at this time.       Cathy Sheriff, [address]

## 2022-05-12 ENCOUNTER — APPOINTMENT (OUTPATIENT)
Dept: MRI IMAGING | Age: 52
DRG: 378 | End: 2022-05-12
Payer: COMMERCIAL

## 2022-05-12 VITALS
TEMPERATURE: 97.5 F | WEIGHT: 248 LBS | DIASTOLIC BLOOD PRESSURE: 76 MMHG | OXYGEN SATURATION: 100 % | BODY MASS INDEX: 34.59 KG/M2 | RESPIRATION RATE: 18 BRPM | SYSTOLIC BLOOD PRESSURE: 144 MMHG | HEART RATE: 60 BPM

## 2022-05-12 LAB
ALBUMIN SERPL-MCNC: 3.8 G/DL (ref 3.5–5.2)
ALP BLD-CCNC: 82 U/L (ref 40–129)
ALT SERPL-CCNC: 15 U/L (ref 0–40)
ANION GAP SERPL CALCULATED.3IONS-SCNC: 9 MMOL/L (ref 7–16)
AST SERPL-CCNC: 18 U/L (ref 0–39)
BASOPHILS ABSOLUTE: 0 E9/L (ref 0–0.2)
BASOPHILS RELATIVE PERCENT: 0 % (ref 0–2)
BILIRUB SERPL-MCNC: <0.2 MG/DL (ref 0–1.2)
BUN BLDV-MCNC: 26 MG/DL (ref 6–20)
CALCIUM SERPL-MCNC: 8.4 MG/DL (ref 8.6–10.2)
CHLORIDE BLD-SCNC: 109 MMOL/L (ref 98–107)
CO2: 21 MMOL/L (ref 22–29)
CREAT SERPL-MCNC: 1 MG/DL (ref 0.7–1.2)
EOSINOPHILS ABSOLUTE: 0.45 E9/L (ref 0.05–0.5)
EOSINOPHILS RELATIVE PERCENT: 5.2 % (ref 0–6)
GFR AFRICAN AMERICAN: >60
GFR NON-AFRICAN AMERICAN: >60 ML/MIN/1.73
GLUCOSE BLD-MCNC: 107 MG/DL (ref 74–99)
HCT VFR BLD CALC: 31.1 % (ref 37–54)
HEMOGLOBIN: 10.4 G/DL (ref 12.5–16.5)
LYMPHOCYTES ABSOLUTE: 2.49 E9/L (ref 1.5–4)
LYMPHOCYTES RELATIVE PERCENT: 28.7 % (ref 20–42)
MCH RBC QN AUTO: 33.1 PG (ref 26–35)
MCHC RBC AUTO-ENTMCNC: 33.4 % (ref 32–34.5)
MCV RBC AUTO: 99 FL (ref 80–99.9)
MONOCYTES ABSOLUTE: 0.17 E9/L (ref 0.1–0.95)
MONOCYTES RELATIVE PERCENT: 1.7 % (ref 2–12)
NEUTROPHILS ABSOLUTE: 5.5 E9/L (ref 1.8–7.3)
NEUTROPHILS RELATIVE PERCENT: 64.4 % (ref 43–80)
NUCLEATED RED BLOOD CELLS: 0 /100 WBC
PDW BLD-RTO: 13.3 FL (ref 11.5–15)
PLATELET # BLD: 209 E9/L (ref 130–450)
PMV BLD AUTO: 10.2 FL (ref 7–12)
POTASSIUM REFLEX MAGNESIUM: 4 MMOL/L (ref 3.5–5)
RBC # BLD: 3.14 E12/L (ref 3.8–5.8)
RBC # BLD: NORMAL 10*6/UL
SODIUM BLD-SCNC: 139 MMOL/L (ref 132–146)
TOTAL PROTEIN: 6.2 G/DL (ref 6.4–8.3)
WBC # BLD: 8.6 E9/L (ref 4.5–11.5)

## 2022-05-12 PROCEDURE — 6360000002 HC RX W HCPCS: Performed by: STUDENT IN AN ORGANIZED HEALTH CARE EDUCATION/TRAINING PROGRAM

## 2022-05-12 PROCEDURE — 6370000000 HC RX 637 (ALT 250 FOR IP): Performed by: STUDENT IN AN ORGANIZED HEALTH CARE EDUCATION/TRAINING PROGRAM

## 2022-05-12 PROCEDURE — 6370000000 HC RX 637 (ALT 250 FOR IP): Performed by: INTERNAL MEDICINE

## 2022-05-12 PROCEDURE — 6360000004 HC RX CONTRAST MEDICATION: Performed by: RADIOLOGY

## 2022-05-12 PROCEDURE — 36415 COLL VENOUS BLD VENIPUNCTURE: CPT

## 2022-05-12 PROCEDURE — 80053 COMPREHEN METABOLIC PANEL: CPT

## 2022-05-12 PROCEDURE — 74183 MRI ABD W/O CNTR FLWD CNTR: CPT

## 2022-05-12 PROCEDURE — 99233 SBSQ HOSP IP/OBS HIGH 50: CPT | Performed by: INTERNAL MEDICINE

## 2022-05-12 PROCEDURE — C9113 INJ PANTOPRAZOLE SODIUM, VIA: HCPCS | Performed by: STUDENT IN AN ORGANIZED HEALTH CARE EDUCATION/TRAINING PROGRAM

## 2022-05-12 PROCEDURE — A9579 GAD-BASE MR CONTRAST NOS,1ML: HCPCS | Performed by: RADIOLOGY

## 2022-05-12 PROCEDURE — 2580000003 HC RX 258: Performed by: STUDENT IN AN ORGANIZED HEALTH CARE EDUCATION/TRAINING PROGRAM

## 2022-05-12 PROCEDURE — 85025 COMPLETE CBC W/AUTO DIFF WBC: CPT

## 2022-05-12 RX ORDER — PANTOPRAZOLE SODIUM 40 MG/1
40 TABLET, DELAYED RELEASE ORAL 2 TIMES DAILY
Status: DISCONTINUED | OUTPATIENT
Start: 2022-05-12 | End: 2022-05-12 | Stop reason: HOSPADM

## 2022-05-12 RX ORDER — PANTOPRAZOLE SODIUM 40 MG/1
40 TABLET, DELAYED RELEASE ORAL
Qty: 60 TABLET | Refills: 0 | Status: SHIPPED | OUTPATIENT
Start: 2022-05-12 | End: 2022-06-28

## 2022-05-12 RX ORDER — SUCRALFATE 1 G/1
1 TABLET ORAL
Qty: 120 TABLET | Refills: 0 | Status: SHIPPED | OUTPATIENT
Start: 2022-05-12

## 2022-05-12 RX ADMIN — SUCRALFATE 1 G: 1 TABLET ORAL at 11:18

## 2022-05-12 RX ADMIN — AMLODIPINE BESYLATE 10 MG: 10 TABLET ORAL at 07:58

## 2022-05-12 RX ADMIN — HYDRALAZINE HYDROCHLORIDE 100 MG: 50 TABLET, FILM COATED ORAL at 15:06

## 2022-05-12 RX ADMIN — GADOTERIDOL 20 ML: 279.3 INJECTION, SOLUTION INTRAVENOUS at 14:51

## 2022-05-12 RX ADMIN — HYDRALAZINE HYDROCHLORIDE 100 MG: 50 TABLET, FILM COATED ORAL at 05:56

## 2022-05-12 RX ADMIN — SUCRALFATE 1 G: 1 TABLET ORAL at 05:56

## 2022-05-12 RX ADMIN — SODIUM CHLORIDE, PRESERVATIVE FREE 10 ML: 5 INJECTION INTRAVENOUS at 07:58

## 2022-05-12 RX ADMIN — SUCRALFATE 1 G: 1 TABLET ORAL at 17:05

## 2022-05-12 RX ADMIN — LISINOPRIL 20 MG: 20 TABLET ORAL at 07:58

## 2022-05-12 RX ADMIN — CLONIDINE HYDROCHLORIDE 0.1 MG: 0.1 TABLET ORAL at 07:58

## 2022-05-12 RX ADMIN — SODIUM CHLORIDE: 9 INJECTION, SOLUTION INTRAVENOUS at 11:20

## 2022-05-12 RX ADMIN — PANTOPRAZOLE SODIUM 40 MG: 40 INJECTION, POWDER, FOR SOLUTION INTRAVENOUS at 07:58

## 2022-05-12 NOTE — PROGRESS NOTES
Department of Surgery   General Surgery  Dr. Jewel Wolfe Progress Note      SUBJECTIVE:      naeo  Doing well  No evidence of gib  vss/af    OBJECTIVE      Physical    VITALS:  /74   Pulse 73   Temp 97.9 °F (36.6 °C) (Oral)   Resp 18   Wt 248 lb (112.5 kg)   SpO2 99%   BMI 34.59 kg/m²   INTAKE/OUTPUT:    Intake/Output Summary (Last 24 hours) at 2022 1103  Last data filed at 2022 1230  Gross per 24 hour   Intake 240 ml   Output --   Net 240 ml     URINARY CATHETER OUTPUT ([employer]):   [  DRAIN/TUBE OUTPUT:   [  TEMPERATURE:  Current - Temp: 97.9 °F (36.6 °C);  Max - Temp  Av.5 °F (36.9 °C)  Min: 97.9 °F (36.6 °C)  Max: 99.1 °F (37.3 °C)  RESPIRATIONS RANGE: Resp  Av  Min: 18  Max: 18  PULSE RANGE: Pulse  Av.8  Min: 65  Max: 73  BLOOD PRESSURE RANGE:  Systolic (94WYD), SGP:817 , Min:119 , HHT:407   ; Diastolic (33DXF), PCS:05, Min:68, Max:74    PULSE OXIMETRY RANGE: SpO2  Av %  Min: 99 %  Max: 99 %    Gen: aox3, nad  Head: nc/at  Neck: trachea midline, no lad  Cvs: rrr  Lung: non labored  Abd: soft, nt/nd, no r/g/r  Neuro: no focal deficits    Data  CBC with Differential:    Lab Results   Component Value Date    WBC 8.6 2022    RBC 3.14 2022    HGB 10.4 2022    HCT 31.1 2022     2022    MCV 99.0 2022    MCH 33.1 2022    MCHC 33.4 2022    RDW 13.3 2022    NRBC 0.0 2022    LYMPHOPCT 28.7 2022    MONOPCT 1.7 2022    BASOPCT 0.0 2022    MONOSABS 0.17 2022    LYMPHSABS 2.49 2022    EOSABS 0.45 2022    BASOSABS 0.00 2022     CMP:    Lab Results   Component Value Date     2022    K 4.0 2022     2022    CO2 21 2022    BUN 26 2022    CREATININE 1.0 2022    GFRAA >60 2022    LABGLOM >60 2022    GLUCOSE 107 2022    PROT 6.2 2022    LABALBU 3.8 2022    CALCIUM 8.4 2022    BILITOT <0.2 2022 ALKPHOS 82 05/12/2022    AST 18 05/12/2022    ALT 15 05/12/2022     BMP:  Hepatic Function Panel:  Ionized Calcium:  No results found for: IONCA  Magnesium:    Lab Results   Component Value Date    MG 2.3 01/19/2022     Phosphorus:    Lab Results   Component Value Date    PHOS 4.7 01/19/2022     LDH:  No results found for: LDH  Uric Acid:  No components found for: URIC  PT/INR:  No results found for: PROTIME, INR  Warfarin PT/INR:  No components found for: PTPATWAR, PTINRWAR  PTT:  No results found for: APTT[APTT  Troponin:  No results found for: TROPONINI  Last 3 Troponin:  No results found for: TROPONINI  Urine Culture:  No components found for: CURINE  Blood Culture:  No components found for: CBLOOD, CFUNGUSBL  Blood Culture from [facility]:  No components found for: CBLOODLN  Stool Culture:  No components found for: CSTOOL  Sputum Culture:  No components found for: CSPUTUM  Sputum Culture for AFB:  No components found for: CAFBSM  Throat Culture:  No components found for: CTHROAT        Current Inpatient Medications    Current Facility-Administered Medications: amLODIPine (NORVASC) tablet 10 mg, 10 mg, Oral, Daily  hydrALAZINE (APRESOLINE) tablet 100 mg, 100 mg, Oral, 3 times per day  cloNIDine (CATAPRES) tablet 0.1 mg, 0.1 mg, Oral, BID  lisinopril (PRINIVIL;ZESTRIL) tablet 20 mg, 20 mg, Oral, Daily  atorvastatin (LIPITOR) tablet 40 mg, 40 mg, Oral, Nightly  pantoprazole (PROTONIX) injection 40 mg, 40 mg, IntraVENous, BID  0.9 % sodium chloride infusion, , IntraVENous, Continuous  sodium chloride flush 0.9 % injection 10 mL, 10 mL, IntraVENous, 2 times per day  sodium chloride flush 0.9 % injection 10 mL, 10 mL, IntraVENous, PRN  0.9 % sodium chloride infusion, , IntraVENous, PRN  potassium chloride (KLOR-CON M) extended release tablet 40 mEq, 40 mEq, Oral, PRN **OR** potassium bicarb-citric acid (EFFER-K) effervescent tablet 40 mEq, 40 mEq, Oral, PRN **OR** potassium chloride 10 mEq/100 mL IVPB (Peripheral Line), 10 mEq, IntraVENous, PRN  ondansetron (ZOFRAN-ODT) disintegrating tablet 4 mg, 4 mg, Oral, Q8H PRN **OR** ondansetron (ZOFRAN) injection 4 mg, 4 mg, IntraVENous, Q6H PRN  senna (SENOKOT) tablet 8.6 mg, 1 tablet, Oral, Daily PRN  acetaminophen (TYLENOL) tablet 650 mg, 650 mg, Oral, Q6H PRN **OR** acetaminophen (TYLENOL) suppository 650 mg, 650 mg, Rectal, Q6H PRN  hydrALAZINE (APRESOLINE) injection 10 mg, 10 mg, IntraVENous, Q6H PRN  melatonin tablet 3 mg, 3 mg, Oral, Nightly PRN  sucralfate (CARAFATE) tablet 1 g, 1 g, Oral, 4x Daily AC & HS    ASSESSMENT AND PLAN    54 yom w anemia s/p EGD with antral gastritis    Continue ppi  Plan for outpatient colonoscopy with Dr. Ethan Hendrickson; no hx of colonoscopy  CT with mild prominence of pancreatic tail; he is to go for MRI  Discussed with Dr. Aretha Terry  F/u as outpatient with Dr. Ethan Hendrickson when discharged    Jewel Wolfe MD  Minimally Invasive General Surgery and Endoscopy  [address] [place] [address].  [address]: [phone]  F: [phone]    Electronically signed by Xin Mckeon MD on 5/12/2022 at 11:06 AM     Xin Mckeon MD [id number] AM

## 2022-05-12 NOTE — PROGRESS NOTES
CHIEF COMPLAINT: Syncope    HISTORY OF PRESENT ILLNESS: Patient is a 54 y.o. male seen at the request of Kavya Turner MD and followed at our office by Dr. Rusty Wharton.      No CP or SOB.      Patient Active Problem List   Diagnosis    Hypertensive emergency    GI bleed    Benign essential HTN       No Known Allergies    Current Facility-Administered Medications   Medication Dose Route Frequency Provider Last Rate Last Admin    amLODIPine (NORVASC) tablet 10 mg  10 mg Oral Daily [place]ron Rumps, DO   10 mg at 05/12/22 6074    hydrALAZINE (APRESOLINE) tablet 100 mg  100 mg Oral 3 times per day [place]ron Rumps, DO   100 mg at 05/12/22 0556    cloNIDine (CATAPRES) tablet 0.1 mg  0.1 mg Oral BID Mandi Rumps, DO   0.1 mg at 05/12/22 0758    lisinopril (PRINIVIL;ZESTRIL) tablet 20 mg  20 mg Oral Daily Mandi Rumps, DO   20 mg at 05/12/22 0758    atorvastatin (LIPITOR) tablet 40 mg  40 mg Oral Nightly Mandi Rumps, DO   40 mg at 05/11/22 2040    pantoprazole (PROTONIX) injection 40 mg  40 mg IntraVENous BID Yuri Serna MD   40 mg at 05/12/22 0758    0.9 % sodium chloride infusion   IntraVENous Continuous Yuri Serna  mL/hr at 05/11/22 1738 New Bag at 05/11/22 1738    sodium chloride flush 0.9 % injection 10 mL  10 mL IntraVENous 2 times per day Yuri Serna MD   10 mL at 05/12/22 0758    sodium chloride flush 0.9 % injection 10 mL  10 mL IntraVENous PRN Yuri Serna MD        0.9 % sodium chloride infusion   IntraVENous PRN Yuri Serna MD        potassium chloride (KLOR-CON M) extended release tablet 40 mEq  40 mEq Oral PRN Yuri Serna MD        Or    potassium bicarb-citric acid (EFFER-K) effervescent tablet 40 mEq  40 mEq Oral PRN Yuri Serna MD        Or    potassium chloride 10 mEq/100 mL IVPB (Peripheral Line)  10 mEq IntraVENous PRN Yuri Serna MD        ondansetron (ZOFRAN-ODT) disintegrating tablet 4 mg  4 mg Oral Q8H PRN Yuri Serna MD        Or    [facility]) [facility] 4 mg  4 mg IntraVENous Q6H PRCOLLEEN Farley MD        [facility]) tablet 8.6 mg  1 tablet Oral Daily PRN Giselle Farley MD        acetaminophen (TYLENOL) tablet 650 mg  650 mg Oral Q6H PRN Giselle Farley MD        Or    acetaminophen (TYLENOL) suppository 650 mg  650 mg Rectal Q6H PRCOLLEEN Farley MD        hydrALAZINE (APRESOLINE) injection 10 mg  10 mg IntraVENous Q6H PRCOLLEEN Farley MD        melatonin tablet 3 mg  3 mg Oral Nightly PRCOLLEEN Farley MD        sucralfate (CARAFATE) tablet 1 g  1 g Oral 4x Daily AC & HS Craig Cordero MD   1 g at 05/12/22 0507       Social History     Socioeconomic History    Marital status:      Spouse name: Not on file    Number of children: Not on file    Years of education: Not on file    Highest education level: Not on file   Occupational History    Not on file   Tobacco Use    Smoking status: Current Every Day Smoker     Packs/day: 1.00     Types: Cigarettes    Smokeless tobacco: Never Used   Vaping Use    Vaping Use: Never used   Substance and Sexual Activity    Alcohol use: Not Currently     Comment: daily    Drug use: Never    Sexual activity: Not on file   Other Topics Concern    Not on file   Social History Narrative    Not on file     Social Determinants of Health     Financial Resource Strain:     Difficulty of Paying Living Expenses: Not on file   Food Insecurity:     Worried About Running Out of Food in the Last Year: Not on file    Ran Out of Food in the Last Year: Not on file   Transportation Needs:     Lack of Transportation (Medical): Not on file    Lack of Transportation (Non-Medical):  Not on file   Physical Activity:     Days of Exercise per Week: Not on file    Minutes of Exercise per Session: Not on file   Stress:     Feeling of Stress : Not on file   Social Connections:     Frequency of Communication with Friends and Family: Not on file    Frequency of Social Gatherings with Friends and Family: Not on file    Attends [employer] Services: Not on file Active Member of Clubs or Organizations: Not on file    Attends Club or Organization Meetings: Not on file    Marital Status: Not on file   Intimate Partner Violence:     Fear of Current or Ex-Partner: Not on file    Emotionally Abused: Not on file    Physically Abused: Not on file    Sexually Abused: Not on file   Housing Stability:     Unable to Pay for Housing in the Last Year: Not on file    Number of Places Lived in the Last Year: Not on file    Unstable Housing in the Last Year: Not on file       No family history on file. Review of Systems:   Heart: as above   Lungs: as above   Eyes: denies changes in vision or discharge. Ears: denies changes in hearing or pain. Nose: denies epistaxis or masses   Throat: denies sore throat or trouble swallowing. Neuro: denies numbness, tingling, tremors. Skin: denies rashes or itching. : denies hematuria, dysuria   GI: denies vomiting, diarrhea   Psych: denies mood changed, anxiety, depression. all others negative. Physical Exam   /74   Pulse 73   Temp 97.9 °F (36.6 °C) (Oral)   Resp 18   Wt 248 lb (112.5 kg)   SpO2 99%   BMI 34.59 kg/m²   Constitutional: Oriented to person, place, and time. Well-developed and well-nourished. No distress. Head: Normocephalic and atraumatic. Eyes: EOM are normal. Pupils are equal, round, and reactive to light. Neck: Normal range of motion. Neck supple. No hepatojugular reflux and no JVD present. Carotid bruit is not present. No tracheal deviation present. No thyromegaly present. Cardiovascular: Normal rate, regular rhythm, normal heart sounds and intact distal pulses. Exam reveals no gallop and no friction rub. No murmur heard. Pulmonary/Chest: Effort normal and breath sounds normal. No respiratory distress. No wheezes. No rales. No tenderness. Abdominal: Soft. Bowel sounds are normal. No distension and no mass. No tenderness. No rebound and no guarding. Musculoskeletal: Normal range of motion.  No edema and no tenderness. Lymphadenopathy:   No cervical adenopathy. No groin adenopathy. Neurological: Alert and oriented to person, place, and time. Skin: Skin is warm and dry. No rash noted. Not diaphoretic. No erythema. Psychiatric: Normal mood and affect. Behavior is normal.     CBC:   Lab Results   Component Value Date    WBC 8.6 05/12/2022    RBC 3.14 05/12/2022    HGB 10.4 05/12/2022    HCT 31.1 05/12/2022    MCV 99.0 05/12/2022    MCH 33.1 05/12/2022    MCHC 33.4 05/12/2022    RDW 13.3 05/12/2022     05/12/2022    MPV 10.2 05/12/2022     BMP:   Lab Results   Component Value Date     05/12/2022    K 4.0 05/12/2022     05/12/2022    CO2 21 05/12/2022    BUN 26 05/12/2022    LABALBU 3.8 05/12/2022    CREATININE 1.0 05/12/2022    CALCIUM 8.4 05/12/2022    GFRAA >60 05/12/2022    LABGLOM >60 05/12/2022     Magnesium:    Lab Results   Component Value Date    MG 2.3 01/19/2022     Cardiac Enzymes:   Lab Results   Component Value Date    TROPHS 13 (H) 05/10/2022    TROPHS 14 (H) 05/10/2022    TROPHS 19 (H) 01/15/2022      PT/INR:  No results found for: PROTIME, INR  TSH:    Lab Results   Component Value Date    TSH 1.310 01/16/2022     Rhythm Strip: normal sinus rhythm. EKG:  normal sinus rhythm, nonspecific ST and T waves changes, RBBB. Echocardiogram reviewed: 1/16/22   Normal left ventricular systolic function. Ejection fraction is visually estimated at 60%. Moderate concentric left ventricular hypertrophy. Dilated right ventricle and normal right ventricular function (TAPSE 2.9 cm). There is doppler evidence of stage II diastolic dysfunction. No evidence of hemodynamically significant valve disease. Unable to estimate PASP due to incomplete tricuspid regurgitation envelope. US renal arteries: 1/18/22  1. Bilateral renal artery stenosis.   Peak velocity in the right renal artery   was 295 centimeters/second and peak velocity in the left renal artery was 269 centimeters/second. RAR on the right was 2.8 and RAR on the left was 2.6.       2.  Both renal veins appear patent. Otherwise normal appearance of the   kidneys. No hydronephrosis. Normal appearance of the imaged bladder. RECOMMENDATIONS:   Recommend screening CTA of the bilateral renal arteries. CTA abdomen/pelvis: 1/19/22  1. No significant renal artery stenosis identified   2. 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 Syncope:     EKG/labs/chart reviewed. Echo 1/2022 reviewed. Appears to have been significantly dehydrated on admission. IV hydration. Stopped HCTZ. Monitor while here. Outpatient monitor after discharge. 2. Prior Episodes of 2:1 AV block/RBBB:    Documented during 1/2022 hospitalization. Avoid AV rohith blocking agents    Monitor as outpatient. 3. Diastolic dysfunction/LVH: Treat HTN. 4. LEE: Improving. 5. HTN: Observe. 6. Lipids: Statin. 7. Probable YINA    8. Ongoing tobacco abuse    9. Prior ETOH use     10. Abnormal CT: Pancreatic findings. Per primary service. 11. Anemia: Follow labs. 12. GIB: Per primary service. Loy Sandoval D.O.   Cardiologist  Cardiology, [address]

## 2022-05-12 NOTE — PROGRESS NOTES
Dr. Porter Confer rounding and updated. Patient to have MRI before discharge. Called MRI to see if it could be done today due to pending discharge.   Willie Burnett RN

## 2022-05-12 NOTE — PROGRESS NOTES
Internal Medicine Progress Note    Patient's name: Vick Dejesus  : 1970  Chief complaints (on day of admission): Loss of Consciousness  Admission date: 5/10/2022  Date of service: 2022   Room: [place] [address]  Primary care physician: Joselo Samuel MD  Reason for visit: Follow-up for LOC     Subjective  Kristi Prater was seen and examined at bedside     Orthostatic vitals today   Appears to be much improved today  Discussed the findings on his CT scan of his abdomen showing a mass on the tail of the pancreas   Discussed risks and benefits of undergoing MRI imaging of this lesion  He agrees to pursue with imaging and potential biopsy and workup afterward if indicated   All questions answered lengthy conversation   Discussed with Dr Leo Zuluaga   Discussed with nursing staff     Current treatment plan discussed and all questions answered     Current medications being prescribed discussed and patient expresses verbal understanding     Review of Systems  There are no new complaints of chest pain, shortness of breath, abdominal pain, nausea, vomiting, diarrhea, constipation unless otherwise mentioned above.      Hospital Medications  Current Facility-Administered Medications   Medication Dose Route Frequency Provider Last Rate Last Admin    amLODIPine (NORVASC) tablet 10 mg  10 mg Oral Daily Geheena Arnold, DO   10 mg at 22 1233    hydrALAZINE (APRESOLINE) tablet 100 mg  100 mg Oral 3 times per day Marya Dillold, DO   100 mg at 22 0556    cloNIDine (CATAPRES) tablet 0.1 mg  0.1 mg Oral BID Marya Arnold, DO   0.1 mg at 22    lisinopril (PRINIVIL;ZESTRIL) tablet 20 mg  20 mg Oral Daily Marya Arnold, DO   20 mg at 22 1233    atorvastatin (LIPITOR) tablet 40 mg  40 mg Oral Nightly Marya Arnold, DO   40 mg at 22    pantoprazole (PROTONIX) injection 40 mg  40 mg IntraVENous BID Cristofer Parker MD   40 mg at 22    0.9 % sodium chloride infusion IntraVENous Continuous Ankit Shay  mL/hr at 05/11/22 1738 New Bag at 05/11/22 1738    sodium chloride flush 0.9 % injection 10 mL  10 mL IntraVENous 2 times per day Ankit Shay MD   10 mL at 05/11/22 2041    sodium chloride flush 0.9 % injection 10 mL  10 mL IntraVENous PRN Ankit Shay MD        0.9 % sodium chloride infusion   IntraVENous PRN Ankit Shay MD        potassium chloride (KLOR-CON M) extended release tablet 40 mEq  40 mEq Oral PRN Ankit Shay MD        Or    potassium bicarb-citric acid (EFFER-K) effervescent tablet 40 mEq  40 mEq Oral PRN Ankit Shay MD        Or    potassium chloride 10 mEq/100 mL IVPB (Peripheral Line)  10 mEq IntraVENous PRN Ankit Shay MD        ondansetron (ZOFRAN-ODT) disintegrating tablet 4 mg  4 mg Oral Q8H PRN Ankit Shay MD        Or    ondansetron [facility]) injection 4 mg  4 mg IntraVENous Q6H PRN Ankit Shay MD        [facility]) tablet 8.6 mg  1 tablet Oral Daily PRN Ankit Shay MD        acetaminophen (TYLENOL) tablet 650 mg  650 mg Oral Q6H PRN Ankit Shay MD        Or   [facility] acetaminophen (TYLENOL) suppository 650 mg  650 mg Rectal Q6H PRN Ankit Shay MD        hydrALAZINE (APRESOLINE) injection 10 mg  10 mg IntraVENous Q6H PRN Ankit Shay MD        melatonin tablet 3 mg  3 mg Oral Nightly PRN Ankit Shay MD        sucralfate (CARAFATE) tablet 1 g  1 g Oral 4x Daily AC & HS Tierra Travis MD   1 g at 05/12/22 0556       PRN Medications  sodium chloride flush, sodium chloride, potassium chloride **OR** potassium alternative oral replacement **OR** potassium chloride, ondansetron **OR** ondansetron, senna, acetaminophen **OR** acetaminophen, hydrALAZINE, melatonin    Objective  Most Recent Recorded Vitals  /73   Pulse 67   Temp 98.4 °F (36.9 °C) (Oral)   Resp 18   Wt 248 lb (112.5 kg)   SpO2 99%   BMI 34.59 kg/m²   I/O last 3 completed shifts: In: 540 [P.O.:240;  I.V.:300]  Out: -   No intake/output data recorded. Physical Exam:  General: AAO to person/place/time/purpose, NAD, no labored breathing  Eyes: conjunctivae/corneas clear, sclera non icteric  Skin: color/texture/turgor normal, no rashes or lesions  Lungs: CTAB, no retractions/use of accessory muscles, no vocal fremitus, no rhonchi, no crackle, no rales  Heart: regular rate, regular rhythm, no murmur  Abdomen: soft, NT, bowel sounds normal  Extremities: atraumatic, no edema  Neurologic: cranial nerves 2-12 grossly intact, no slurred speech    Most Recent Labs  Lab Results   Component Value Date    WBC 8.6 05/12/2022    HGB 10.4 (L) 05/12/2022    HCT 31.1 (L) 05/12/2022     05/12/2022     05/12/2022    K 4.0 05/12/2022     (H) 05/12/2022    CREATININE 1.0 05/12/2022    BUN 26 (H) 05/12/2022    CO2 21 (L) 05/12/2022    GLUCOSE 107 (H) 05/12/2022    ALT 15 05/12/2022    AST 18 05/12/2022    TSH 1.310 01/16/2022    LABA1C 5.3 01/16/2022       US RETROPERITONEAL COMPLETE   Final Result   1. Bilateral renal cortical thinning. There is no hydronephrosis. 2.  Bladder not well distended for this exam.  No gross abnormality. CT ABDOMEN PELVIS WO CONTRAST Additional Contrast? None   Final Result   1. There is mild prominence or thickening in the distal pancreatic tail, but   no change from January 2022. Considerations include lack of fatty   infiltration, splenic nodule within the pancreas, versus an early pancreatic   mass. Consider pancreas MRI. 2.  No urinary stones or obstruction. 3.  A minimal left abdomen small bowel ileus. 4.  No obvious bowel wall thickening or significant inflammatory change. 5.  Chronic small umbilical hernia containing only fat. RECOMMENDATIONS:   Unavailable         CT Head WO Contrast   Final Result   No acute intracranial abnormality.   Specifically, there is no acute   intracranial hemorrhage      Mild mucosal thickening seen within the right and left maxillary sinuses and ethmoid air cells. XR CHEST PORTABLE   Final Result   No acute process. Echocardiogram       Assessment   Active Hospital Problems    Diagnosis     GI bleed [K92.2]      Priority: Medium    Benign essential HTN [I10]      Priority: Medium         Plan  · GI bleeding   ? General surgery consult for EGD   ? FOBT + in ED   ? EGD 10/11 Antral Gastritis   ? PPI   ? Outpatient colonoscopy with Dr Jackie Stuart   · Syncope with hypotension   ? Cardiology eval noted   ? Recent ECHO reviewed   ? Significant cardiac risk factors   ? Had chest pain prior to event   ? Telemetry   ? Orthostatic vitals negative   ? Stop HCTZ now and on DC   ? Will need to adjust home anti htn rx   · Pancreatic lesion   · MRI today   · Continue IVFs   · PT AM-PAC-- Independent   · Consults Gen surge, cardio   · DVT prophylaxis   · Code status Full   · Medications, labs and imaging reviewed   · Discharge plan: MRI today     Electronically signed by Ayaka Guevara MD on 5/12/2022 at 7:27 AM    I can be reached through [facility].

## 2022-05-12 NOTE — CARE COORDINATION
Plan remains to return home w/ wife on discharge after MRI abd completed. No needs.  Halina Martel RN case manager

## 2022-05-13 NOTE — DISCHARGE SUMMARY
Internal Medicine Discharge Summary    NAME: Tim Rose :  1970  MRN:  [id number] Christopher Holt MD    ADMITTED: 5/10/2022   DISCHARGED: 2022  5:06 PM    ADMITTING PHYSICIAN: No att. providers found    PCP: Flynn Bell MD    CONSULTANT(S):   IP CONSULT TO INTERNAL MEDICINE  IP CONSULT TO GENERAL SURGERY  IP CONSULT TO CASE MANAGEMENT  IP CONSULT TO SOCIAL WORK  IP CONSULT TO CARDIOLOGY     ADMITTING DIAGNOSIS:   Syncope and collapse [R55]  GI bleed [K92.2]  LEE (acute kidney injury) ([facility] [address] [N17.9]  Mass of pancreas [K86.89]  Gastrointestinal hemorrhage, unspecified gastrointestinal hemorrhage type [K92.2]  Anemia, unspecified type [D64.9]     Please see H&P for further details    DISCHARGE DIAGNOSES:   Active Hospital Problems    Diagnosis     GI bleed [K92.2]      Priority: Medium    Benign essential HTN [I10]      Priority: Medium       BRIEF HISTORY OF PRESENT ILLNESS: Tim Rose is a 54 y.o. male patient of Flynn Bell MD who  has no past medical history on file. who originally had concerns including Loss of Consciousness. at presentation on 5/10/2022, and was found to have Syncope and collapse [R55]  GI bleed [K92.2]  LEE (acute kidney injury) ([facility] [address] [N17.9]  Mass of pancreas [K86.89]  Gastrointestinal hemorrhage, unspecified gastrointestinal hemorrhage type [K92.2]  Anemia, unspecified type [D64.9] after workup. Please see H&P for further details. HOSPITAL COURSE:   The patient presented to the hospital with the chief complaint of Loss of Consciousness  . The patient was admitted to the hospital.     · GI bleeding   ? General surgery consult for EGD   ? FOBT + in ED   ? EGD 10/11 Antral Gastritis   ? PPI   ? Outpatient colonoscopy with Dr Marleny Poe   · Syncope with hypotension   ? Cardiology eval noted   ? Recent ECHO reviewed   ? Significant cardiac risk factors   ? Had chest pain prior to event   ? Telemetry   ? Orthostatic vitals negative   ? Stop HCTZ now and on DC   ?  Will need to adjust home anti htn rx   · Pancreatic lesion   ? MRI today   ? Continue IVFs   · PT AM-PAC-- Independent   · Consults Gen surge, cardio   · DVT prophylaxis   · Code status Full   · Medications, labs and imaging reviewed   · Discharge plan: MRI today     Pancreatic tail 2.4 x 2.4 cm nodule with MRI characteristics consistent with   intrapancreatic splenule.  No further follow-up is indicated. BRIEF PHYSICAL EXAMINATION AND LABORATORIES ON DAY OF DISCHARGE:  VITALS:  BP (!) 144/76   Pulse 60   Temp 97.5 °F (36.4 °C) (Oral)   Resp 18   Wt 248 lb (112.5 kg)   SpO2 100%   BMI 34.59 kg/m²       Please see note from the same day. LABS[de-identified]  Recent Labs     05/11/22  0405 05/12/22  0355    139   K 4.0 4.0   * 109*   CO2 19* 21*   BUN 26* 26*   CREATININE 1.2 1.0   GLUCOSE 96 107*   CALCIUM 9.0 8.4*     Recent Labs     05/11/22  0405 05/12/22  0355   ALKPHOS 73 82   ALT 13 15   AST 18 18   PROT 6.6 6.2*   BILITOT 0.3 <0.2   LABALBU 4.0 3.8     Recent Labs     05/11/22  0405 05/11/22  1152 05/12/22  0355   WBC 8.9  --  8.6   RBC 3.45*  --  3.14*   HGB 11.1* 11.9* 10.4*   HCT 33.9* 35.9* 31.1*   MCV 98.3  --  99.0   MCH 32.2  --  33.1   MCHC 32.7  --  33.4   RDW 13.6  --  13.3     --  209   MPV 10.3  --  10.2     Lab Results   Component Value Date    LABA1C 5.3 01/16/2022     No results found for: INR, PROTIME   Lab Results   Component Value Date    TSH 1.310 01/16/2022     Lab Results   Component Value Date    TRIG 181 (H) 01/16/2022    HDL 36 01/16/2022    LDLCALC 132 (H) 01/16/2022     No results for input(s): MG in the last 72 hours. No results for input(s): CKTOTAL, CKMB, TROPONINI in the last 72 hours. No results for input(s): LACTA in the last 72 hours.     IMAGING:  CT ABDOMEN PELVIS WO CONTRAST Additional Contrast? None    Result Date: 5/10/2022  EXAMINATION: CT OF THE ABDOMEN AND PELVIS WITHOUT CONTRAST 5/10/2022 11:21 am TECHNIQUE: CT of the abdomen and pelvis was performed without the administration of intravenous contrast. Multiplanar reformatted images are provided for review. Automated exposure control, iterative reconstruction, and/or weight based adjustment of the mA/kV was utilized to reduce the radiation dose to as low as reasonably achievable. Dose is total DLP 1552.60 M Gy cm. COMPARISON: CT abdomen and pelvis 01/19/2022. HISTORY: ORDERING SYSTEM PROVIDED HISTORY: yassine, gibleed TECHNOLOGIST PROVIDED HISTORY: Reason for exam:->yassine, gibleed Additional Contrast?->None Decision Support Exception - unselect if not a suspected or confirmed emergency medical condition->Emergency Medical Condition (MA) FINDINGS: Lower Chest: A stable 3 mm pleural based nodule anterior margin right middle lobe on series 3, image 15. This does not need additional follow-up. No basilar infiltrate or effusion. Heart size normal.  No hiatal hernia. Organs: Liver: Liver length 18 cm. No obvious liver masses or bile duct dilatation. Liver margins are smooth. The gallbladder has normal size. No calcified stones or wall thickening. The common bile duct measures 2 mm. The pancreas has a chronic area of slight thickening in the pancreatic tail measuring 2.4 cm x 2.3 cm by 1.7 cm. Stable appearance but there are no older studies than January 2022. This could just be an area of less fatty infiltration. Additional evaluation could be done with pancreas MRI. Pancreatic duct is not dilated. No obvious pancreatic cyst. The spleen measures 11 cm, within normal size. A 1 cm accessory splenic nodule. The adrenal glands are normal. Kidneys: No renal or ureter stone or hydronephrosis on either side. No obvious renal cortical lesions or cysts. Normal aorta and IVC. Minimal calcification near the aortic bifurcation. No stenosis or aneurysm. Left renal vein crosses anterior to the aorta. Lymph nodes: Scattered small retroperitoneal lymph nodes just below the renal vessels measuring up to 0.9 cm.   A proximal aortocaval lymph node just below the renal vessels measures 1.9 cm long by 0.8 cm diameter. A few tiny lymph nodes in the gastrohepatic ligament and near the celiac artery. A few small right upper quadrant lymph nodes. A few scattered tiny lymph nodes in the small bowel mesentery. None of these are definitely pathological. GI/Bowel: No hiatal hernia. Non distended stomach. A couple slightly dilated left mid abdomen small bowel loops contain fluid and small amounts of gas, but without a definite transition point. Nonspecific appearance. Distal small bowel loops are not dilated. Chronic tiny umbilical hernia containing only fat. Scarring right lower quadrant abdominal wall. No inguinal hernia. The appendix is not identified and may be absent. No evidence of appendicitis. Average amount of residual stool. No acute inflammatory changes or obstruction. No diverticulitis. Pelvis: The urinary bladder was moderately distended. No stones or wall thickening. No distal ureter stone. Prostate and seminal vesicles are small in size. Peritoneum/Retroperitoneum: No free air, free fluid or abscess. Bones/Soft Tissues: No acute fracture or compression deformity. Moderate spurring in the lower thoracic spine. Moderate lumbar degeneration. At L2/3, there is a moderate disc bulge with 3 mm posterior subluxation and moderate loss of disc height. Mild central stenosis. At L3/4, there is a large disc bulge, flattening of the disc space with central vacuum disc degeneration and mild central stenosis. A 2 mm posterior subluxation. At L4/L5, there is flattening of the disc with moderate vacuum degeneration, moderate disc bulge, with evidence for annular disc tear and or small disc herniation. Small amounts of nitrogen gas are seen posterior to the disc margin to the left of midline. Mild central stenosis and moderate neural foraminal stenosis.   At L5/S1 there is flattening of the disc space with moderate vacuum disc degeneration and a mild disc bulge. Moderate neural foraminal stenosis. 1.  There is mild prominence or thickening in the distal pancreatic tail, but no change from January 2022. Considerations include lack of fatty infiltration, splenic nodule within the pancreas, versus an early pancreatic mass. Consider pancreas MRI. 2.  No urinary stones or obstruction. 3.  A minimal left abdomen small bowel ileus. 4.  No obvious bowel wall thickening or significant inflammatory change. 5.  Chronic small umbilical hernia containing only fat. RECOMMENDATIONS: Unavailable     CT Head WO Contrast    Result Date: 5/10/2022  EXAMINATION: CT OF THE HEAD WITHOUT CONTRAST  5/10/2022 10:30 am TECHNIQUE: CT of the head was performed without the administration of intravenous contrast. Dose modulation, iterative reconstruction, and/or weight based adjustment of the mA/kV was utilized to reduce the radiation dose to as low as reasonably achievable. COMPARISON: None. HISTORY: ORDERING SYSTEM PROVIDED HISTORY: syncope TECHNOLOGIST PROVIDED HISTORY: Reason for exam:->syncope Has a \"code stroke\" or \"stroke alert\" been called? ->No Decision Support Exception - unselect if not a suspected or confirmed emergency medical condition->Emergency Medical Condition (MA) FINDINGS: BRAIN/VENTRICLES: There is no acute intracranial hemorrhage, mass effect or midline shift. No abnormal extra-axial fluid collection. The gray-white differentiation is maintained without evidence of an acute infarct. There is no evidence of hydrocephalus. ORBITS: The visualized portion of the orbits demonstrate no acute abnormality. SINUSES: The visualized paranasal sinuses and mastoid air cells demonstrate no acute abnormality. There is mucosal thickening seen within the maxillary sinuses and ethmoid air cells. SOFT TISSUES/SKULL:  No acute abnormality of the visualized skull or soft tissues. No acute intracranial abnormality.   Specifically, there is no acute intracranial hemorrhage Mild mucosal thickening seen within the right and left maxillary sinuses and ethmoid air cells. MRI ABDOMEN W WO CONTRAST    Result Date: 5/12/2022  EXAMINATION: MRI OF THE ABDOMEN WITHOUT AND WITH CONTRAST 5/12/2022 12:14 pm TECHNIQUE: Multiplanar multisequence MRI of the abdomen was performed without and with the administration of intravenous contrast.  After initial T2 axial and coronal images, thick slab, thin slab and 3D coronal MRCP sequences were obtained without the administration of intravenous contrast.  MIP images are provided for review. Multiplanar multisequence MRI of the abdomen was performed without and with the administration of intravenous contrast. Contrast dose: 20 mL ProHance COMPARISON: CT abdomen and pelvis without contrast 05/10/2022. CTA abdomen and pelvis 01/19/2022. CT abdomen and pelvis with contrast 01/15/2022. HISTORY: ORDERING SYSTEM PROVIDED HISTORY: Attention pancreas, pancreatic mass on tail TECHNOLOGIST PROVIDED HISTORY: Reason for exam:->Attention pancreas, pancreatic mass on tail Specify organ?->Pancreas FINDINGS: No significant dropout of hepatic T1 signal intensity seen on out of phase imaging. No enhancing hepatic mass is visualized. The hepatic and portal veins are patent. The gallbladder is unremarkable. No biliary ductal dilatation is seen. No common bile duct filling defect is identified. A pancreatic tail 2.4 x 2.4 cm nodule is identified with smooth margins and demonstrates MRI signal characteristics and enhancement identical to the spleen. There is no associated diffusion restriction. The remainder of the pancreas demonstrates normal signal intensity without focal suspicious mass identified. No pancreatic ductal dilatation is seen. No significant peripancreatic abnormality. Additional small splenules are noted in the splenic hilum. The spleen is normal in size. The adrenal glands are unremarkable.   The kidneys demonstrate homogeneous enhancement without hydronephrosis or enhancing mass. No bowel dilatation or wall thickening is seen. No ascites is identified. No enlarged abdominal lymph nodes are seen. Abdominal aorta is normal caliber. Bone marrow is normal in signal without evidence of fracture, marrow contusion or marrow occupying lesion. Pancreatic tail 2.4 x 2.4 cm nodule with MRI characteristics consistent with intrapancreatic splenule. No further follow-up is indicated. XR CHEST PORTABLE    Result Date: 5/10/2022  EXAMINATION: ONE XRAY VIEW OF THE CHEST 5/10/2022 8:38 am COMPARISON: 15 January 2022 HISTORY: ORDERING SYSTEM PROVIDED HISTORY: syncope TECHNOLOGIST PROVIDED HISTORY: Reason for exam:->syncope FINDINGS: The lungs are without acute focal process. There is no effusion or pneumothorax. The cardiomediastinal silhouette is without acute process. The osseous structures are without acute process. No acute process. US RETROPERITONEAL COMPLETE    Result Date: 5/10/2022  EXAMINATION: RETROPERITONEAL ULTRASOUND OF THE KIDNEYS AND URINARY BLADDER 5/10/2022 COMPARISON: CT abdomen and pelvis from the same day. HISTORY: ORDERING SYSTEM PROVIDED HISTORY: LEE TECHNOLOGIST PROVIDED HISTORY: Reason for exam:->LEE What reading provider will be dictating this exam?->CRC FINDINGS: Kidneys: The right kidney measures 10.4 cm in length and the left kidney measures 11.3 cm in length. The corticomedullary differentiation and cortical thickness is decreased bilaterally. No renal mass, renal cysts, nor intrarenal calcification. There is no hydronephrosis. Bladder: No intrinsic mass, intrinsic calcification, nor definite wall thickening. The bladder is not well distended for this exam.  Bilateral ureteric jets seen. 1.  Bilateral renal cortical thinning. There is no hydronephrosis. 2.  Bladder not well distended for this exam.  No gross abnormality.        MICROBIOLOGY:  BLOOD CX #1  No results for input(s): BC in the last 72 hours. BLOOD CX #2  No results for input(s): Gevena Dre in the last 72 hours. TIP CULTURE  No results for input(s): CXCATHTIP in the last 72 hours. CULTURE, RESPIRATORY   No results for input(s): CULTRESP in the last 72 hours. RESPIRATORY SMEAR  No results for input(s): RESPSMEAR in the last 72 hours. ECHO:      DISPOSITION:  The patient's condition is good. The patient is being discharged to home    DISCHARGE MEDICATIONS:      Medication List      START taking these medications    pantoprazole 40 MG tablet  Commonly known as: PROTONIX  Take 1 tablet by mouth 2 times daily (before meals)     sucralfate 1 GM tablet  Commonly known as: CARAFATE  Take 1 tablet by mouth 4 times daily (before meals and nightly)        CHANGE how you take these medications    amLODIPine 10 MG tablet  Commonly known as: NORVASC  What changed: Another medication with the same name was removed. Continue taking this medication, and follow the directions you see here. lisinopril 20 MG tablet  Commonly known as: PRINIVIL;ZESTRIL  What changed: Another medication with the same name was removed. Continue taking this medication, and follow the directions you see here.         CONTINUE taking these medications    atorvastatin 40 MG tablet  Commonly known as: LIPITOR  Take 1 tablet by mouth nightly     cloNIDine 0.1 MG tablet  Commonly known as: CATAPRES  Take 1 tablet by mouth 2 times daily     hydrALAZINE 100 MG tablet  Commonly known as: APRESOLINE  Take 1 tablet by mouth 3 times daily        STOP taking these medications    hydroCHLOROthiazide 25 MG tablet  Commonly known as: HYDRODIURIL           Where to Get Your Medications      These medications were sent to [address] [place] [address] [address] [facility] [id number]    Phone: [phone]   · pantoprazole 40 MG tablet  · sucralfate 1 GM tablet         Discharge Medication List as of 5/12/2022  4:25 PM        Discharge Medication List as of 5/12/2022  4:25 PM      STOP taking these medications       hydroCHLOROthiazide (HYDRODIURIL) 25 MG tablet Comments:   Reason for Stopping:         hydroCHLOROthiazide (HYDRODIURIL) 25 MG tablet Comments:   Reason for Stopping:             Discharge Medication List as of 5/12/2022  4:25 PM      START taking these medications    Details   pantoprazole (PROTONIX) 40 MG tablet Take 1 tablet by mouth 2 times daily (before meals), Disp-60 tablet, R-0Normal      sucralfate (CARAFATE) 1 GM tablet Take 1 tablet by mouth 4 times daily (before meals and nightly), Disp-120 tablet, R-0Normal             INTERNAL MEDICINE FOLLOW UP/INSTRUCTIONS:  · Follow-up with primary care physician within 1 week of discharge from hospital  · Please review changes to pre-hospital admission medications and prescriptions for new medications upon discharge from the hospital with PCP  · Please review results of labs and imaging studies with PCP  · Follow-up with consultants as directed by them   · If recurrence or worsening of symptoms please call primary care physician or return to the ER immediately  · Diet: No diet orders on file    Preparing for this patient's discharge, including paperwork, orders, instructions, and meeting with patient did required >35 minutes.     Electronically signed by Edenilson Laird MD on 5/13/2022 at 12:30 PM

## 2022-05-17 ENCOUNTER — TELEPHONE (OUTPATIENT)
Dept: CARDIOLOGY CLINIC | Age: 52
End: 2022-05-17

## 2022-06-28 ENCOUNTER — OFFICE VISIT (OUTPATIENT)
Dept: CARDIOLOGY CLINIC | Age: 52
End: 2022-06-28
Payer: COMMERCIAL

## 2022-06-28 VITALS
DIASTOLIC BLOOD PRESSURE: 62 MMHG | WEIGHT: 248.1 LBS | OXYGEN SATURATION: 99 % | BODY MASS INDEX: 34.73 KG/M2 | HEIGHT: 71 IN | HEART RATE: 79 BPM | SYSTOLIC BLOOD PRESSURE: 130 MMHG | RESPIRATION RATE: 16 BRPM

## 2022-06-28 DIAGNOSIS — G47.33 OSA (OBSTRUCTIVE SLEEP APNEA): ICD-10-CM

## 2022-06-28 DIAGNOSIS — I10 PRIMARY HYPERTENSION: Primary | ICD-10-CM

## 2022-06-28 DIAGNOSIS — I44.30 AV BLOCK: ICD-10-CM

## 2022-06-28 DIAGNOSIS — I45.10 RBBB: ICD-10-CM

## 2022-06-28 DIAGNOSIS — Z72.0 TOBACCO ABUSE: ICD-10-CM

## 2022-06-28 PROCEDURE — 99214 OFFICE O/P EST MOD 30 MIN: CPT | Performed by: INTERNAL MEDICINE

## 2022-06-28 PROCEDURE — 93000 ELECTROCARDIOGRAM COMPLETE: CPT | Performed by: INTERNAL MEDICINE

## 2022-06-28 NOTE — PROGRESS NOTES
OUTPATIENT CARDIOLOGY FOLLOW-UP    Name: Mazin Rg    Age: 54 y.o. Primary Care Physician: Jagjit Ma MD    Date of Service: 6/28/2022    Chief Complaint: HTN, AV block    Interim History:  Syncopal episode in 5/2022 (reported dehydration at that time) -- HCTZ stopped and norvasc dose decreased to 5 mg daily ---> no further episodes. He denies recent chest pain, SOB, palpitations, orthopnea, or PND. Episodes of 2:1 AV block noted during 1/2022 hospitalization (primarily while sleeping). BP today 130/62. SR on EKG. Review of Systems:   Cardiac: As per HPI  General: No fever, chills  Pulmonary: As per HPI  HEENT: No visual disturbances, difficult swallowing  GI: No nausea, vomiting  : No dysuria, hematuria  Endocrine: No thyroid disease or DM  Musculoskeletal: HAYNES x 4, no focal motor deficits  Skin: Intact, no rashes  Neuro: No headache, seizures  Psych: Currently with no depression, anxiety    Past Medical History:  HTN  Tobacco abuse  LVH  RBBB    Past Surgical History:  Past Surgical History:   Procedure Laterality Date    APPENDECTOMY      HAND SURGERY Right     KNEE ARTHROSCOPY Bilateral     UPPER GASTROINTESTINAL ENDOSCOPY N/A 05/11/2022    EGD BIOPSY performed by Essence Holt MD at [facility] ENDOSCOPY       Family History: Mother: living, with no known history of CAD.   Father: DM  Sister: Hx of HTN    Social History:  Social History     Socioeconomic History    Marital status:      Spouse name: Not on file    Number of children: Not on file    Years of education: Not on file    Highest education level: Not on file   Occupational History    Not on file   Tobacco Use    Smoking status: Current Every Day Smoker     Packs/day: 1.00     Years: 15.00     Pack years: 15.00     Types: Cigarettes    Smokeless tobacco: Never Used   Vaping Use    Vaping Use: Never used   Substance and Sexual Activity    Alcohol use: Not Currently    Drug use: Never    Sexual activity: Not on file Other Topics Concern    Not on file   Social History Narrative    Not on file     Social Determinants of Health     Financial Resource Strain:     Difficulty of Paying Living Expenses: Not on file   Food Insecurity:     Worried About Running Out of Food in the Last Year: Not on file    Mukesh of Food in the Last Year: Not on file   Transportation Needs:     Lack of Transportation (Medical): Not on file    Lack of Transportation (Non-Medical):  Not on file   Physical Activity:     Days of Exercise per Week: Not on file    Minutes of Exercise per Session: Not on file   Stress:     Feeling of Stress : Not on file   Social Connections:     Frequency of Communication with Friends and Family: Not on file    Frequency of Social Gatherings with Friends and Family: Not on file    Attends Baptism Services: Not on file    Active Member of [address] [place] [address] or Organizations: Not on file    Attends Club or Organization Meetings: Not on file    Marital Status: Not on file   Intimate Partner Violence:     Fear of Current or Ex-Partner: Not on file    Emotionally Abused: Not on file    Physically Abused: Not on file    Sexually Abused: Not on file   Housing Stability:     Unable to Pay for Housing in the Last Year: Not on file    Number of [place] in the Last Year: Not on file    Unstable Housing in the Last Year: Not on file       Allergies:  No Known Allergies    Current Medications:  Current Outpatient Medications   Medication Sig Dispense Refill    pantoprazole (PROTONIX) 40 MG tablet Take 1 tablet by mouth 2 times daily (before meals) 60 tablet 0    sucralfate (CARAFATE) 1 GM tablet Take 1 tablet by mouth 4 times daily (before meals and nightly) 120 tablet 0    amLODIPine (NORVASC) 10 MG tablet Take 5 mg by mouth every morning       lisinopril (PRINIVIL;ZESTRIL) 20 MG tablet Take 20 mg by mouth every morning      atorvastatin (LIPITOR) 40 MG tablet Take 1 tablet by mouth nightly 30 tablet 3    cloNIDine (CATAPRES) 0.1 MG tablet Take 1 tablet by mouth 2 times daily 60 tablet 3    hydrALAZINE (APRESOLINE) 100 MG tablet Take 1 tablet by mouth 3 times daily 90 tablet 3     No current facility-administered medications for this visit. Physical Exam:  /62 (Site: Right Upper Arm, Position: Sitting, Cuff Size: Medium Adult)   Pulse 79   Resp 16   Ht 5' 11\" (1.803 m)   Wt 248 lb 1.6 oz (112.5 kg)   SpO2 99%   BMI 34.60 kg/m²   Wt Readings from Last 3 Encounters:   06/28/22 248 lb 1.6 oz (112.5 kg)   05/11/22 248 lb (112.5 kg)   05/12/22 254 lb 14.4 oz (115.6 kg)     Appearance: Awake, alert and oriented x 3, no acute respiratory distress  Skin: Intact, no rash  Head: Normocephalic, atraumatic  Eyes: EOMI, no conjunctival erythema  ENMT: No pharyngeal erythema, MMM, no rhinorrhea  Neck: Supple, no elevated JVP, no carotid bruits  Lungs: Clear to auscultation bilaterally. No wheezes, rales, or rhonchi. Cardiac: Regular rate and rhythm, +S1S2, no murmurs apparent  Abdomen: Soft, nontender, +bowel sounds  Extremities: Moves all extremities x 4, no lower extremity edema  Neurologic: No focal motor deficits apparent, normal mood and affect, alert and oriented x 3    Laboratory Tests:  Lab Results   Component Value Date    CREATININE 1.0 05/12/2022    BUN 26 (H) 05/12/2022     05/12/2022    K 4.0 05/12/2022     (H) 05/12/2022    CO2 21 (L) 05/12/2022     Lab Results   Component Value Date    MG 2.3 01/19/2022     Lab Results   Component Value Date    WBC 8.6 05/12/2022    HGB 10.4 (L) 05/12/2022    HCT 31.1 (L) 05/12/2022    MCV 99.0 05/12/2022     05/12/2022     Lab Results   Component Value Date    ALT 15 05/12/2022    AST 18 05/12/2022    ALKPHOS 82 05/12/2022    BILITOT <0.2 05/12/2022     No results found for: CKTOTAL, CKMB, CKMBINDEX, TROPONINI  No results for input(s): CKTOTAL, CKMB, CKMBINDEX, TROPHS in the last 72 hours.   No results found for: INR, PROTIME  Lab Results Component Value Date    TSH 1.310 01/16/2022     Lab Results   Component Value Date    LABA1C 5.3 01/16/2022     No results found for: EAG  Lab Results   Component Value Date    CHOL 204 (H) 01/16/2022     Lab Results   Component Value Date    TRIG 181 (H) 01/16/2022     Lab Results   Component Value Date    HDL 36 01/16/2022     Lab Results   Component Value Date    LDLCALC 132 (H) 01/16/2022     Lab Results   Component Value Date    LABVLDL 36 01/16/2022     No results found for: CHOLHDLRATIO  No results for input(s): PROBNP in the last 72 hours. Cardiac Tests:  EKG reviewed (6/28/22): SR, rate 79, RBBB    EKG reviewed (2/22/22): SR, rate 92, RBBB    EKG reviewed (1/2022): SB, rate 38, 2:1 AV block, RBBB     EKG reviewed (1/2022): SB, rate 57, RBBB     Telemetry reviewed (date: 1/16/2022): SR, rate 60's, episodes of 2:1 AV block overnight while sleeping     Echocardiogram reviewed: 1/16/22   Normal left ventricular systolic function.   Ejection fraction is visually estimated at 60%.  Moderate concentric left ventricular hypertrophy.   Dilated right ventricle and normal right ventricular function (TAPSE 2.9 cm).   There is doppler evidence of stage II diastolic dysfunction.   No evidence of hemodynamically significant valve disease.   Unable to estimate PASP due to incomplete tricuspid regurgitation envelope. US renal arteries: 1/18/22  1.  Bilateral renal artery stenosis.  Peak velocity in the right renal artery   was 295 centimeters/second and peak velocity in the left renal artery was 269   centimeters/second.  RAR on the right was 2.8 and RAR on the left was 2.6.       2.  Both renal veins appear patent.  Otherwise normal appearance of the   kidneys.  No hydronephrosis.  Normal appearance of the imaged bladder.       RECOMMENDATIONS:   Recommend screening CTA of the bilateral renal arteries. CTA abdomen/pelvis: 1/19/22  1. No significant renal artery stenosis identified   2.  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 Sinus bradycardia/sinus rhythm with episodes of 2:1 AV block during sleeping hours/increased vagal tone/probable YINA (during 1/2022 hospitalization). No clinical indication for PPM at this time. TSH normal.  2. Hypertensive emergency (1/2022 hospitalization -- untreated HTN as an outpatient at that time; he was not following with a PCP regularly). BP control improved on multiple anti-HTN agents. BP today 130/62, BP at last office visit 110/60.  3. Syncopal episode in 5/2022 (reported dehydration at that time) -- HCTZ stopped and norvasc dose decreased to 5 mg daily ---> no further episodes  4. Ongoing tobacco abuse -- 1 PPD x 30 years  5. History of daily ETOH use -- no recent ETOH intake  6. BMI 32.5 --> 34.7 --> 34.6  7. Probable YINA     8. Hx of right wrist injury s/p surgery   9. HLD -- on statin  10. ASCVD risk score 19.7%  11. Chronic RBBB  12. Hypokalemia -- K 3.3 (normal Mg) --> 3.7 --> 4.0  13. Moderate LVH  14. Diastolic dysfunction    - Patient started on multiple anti-HTN agents during 1/2022 hospitalization (medications adjusted during 5/2022 hospitalization as noted above)  - Avoid AV rohith blocking agents  - Continue current medications  - Results of 1/16/22 echocardiogram outlined above  - Recommend performing an outpatient sleep study if no prior study --> cardiac complications associated with untreated YINA reviewed today (6/28/22)  - Aggressive risk factor modifications / tobacco cessation / continued ETOH cessation  - Discussed option of outpatient cardiac monitoring    Greater than 30 minutes was spent counseling the patient, reviewing the rationale for the above recommendations and reviewing the patient's current medication list, problem list and results of all previously ordered testing.     Lillie Garza MD  [facility]) Cardiology

## 2022-07-08 ENCOUNTER — HOSPITAL ENCOUNTER (OUTPATIENT)
Age: 52
Discharge: HOME | End: 2022-07-10

## 2022-07-08 PROCEDURE — 88305 TISSUE EXAM BY PATHOLOGIST: CPT

## 2023-05-12 ENCOUNTER — HOSPITAL ENCOUNTER (EMERGENCY)
Age: 53
Discharge: HOME | End: 2023-05-12
Attending: STUDENT IN AN ORGANIZED HEALTH CARE EDUCATION/TRAINING PROGRAM
Payer: COMMERCIAL

## 2023-05-12 ENCOUNTER — APPOINTMENT (OUTPATIENT)
Dept: GENERAL RADIOLOGY | Age: 53
End: 2023-05-12
Payer: COMMERCIAL

## 2023-05-12 VITALS
HEIGHT: 71 IN | SYSTOLIC BLOOD PRESSURE: 133 MMHG | RESPIRATION RATE: 14 BRPM | DIASTOLIC BLOOD PRESSURE: 81 MMHG | BODY MASS INDEX: 30.8 KG/M2 | OXYGEN SATURATION: 98 % | HEART RATE: 98 BPM | TEMPERATURE: 98.8 F | WEIGHT: 220 LBS

## 2023-05-12 DIAGNOSIS — Z23 NEED FOR TETANUS BOOSTER: ICD-10-CM

## 2023-05-12 DIAGNOSIS — S67.191A CRUSHING INJURY OF LEFT INDEX FINGER, INITIAL ENCOUNTER: Primary | ICD-10-CM

## 2023-05-12 DIAGNOSIS — S61.311A LACERATION OF LEFT INDEX FINGER WITHOUT FOREIGN BODY WITH DAMAGE TO NAIL, INITIAL ENCOUNTER: ICD-10-CM

## 2023-05-12 PROCEDURE — 6370000000 HC RX 637 (ALT 250 FOR IP): Performed by: NURSE PRACTITIONER

## 2023-05-12 PROCEDURE — 90714 TD VACC NO PRESV 7 YRS+ IM: CPT | Performed by: NURSE PRACTITIONER

## 2023-05-12 PROCEDURE — 2580000003 HC RX 258: Performed by: NURSE PRACTITIONER

## 2023-05-12 PROCEDURE — 96374 THER/PROPH/DIAG INJ IV PUSH: CPT

## 2023-05-12 PROCEDURE — 6360000002 HC RX W HCPCS: Performed by: NURSE PRACTITIONER

## 2023-05-12 PROCEDURE — 99284 EMERGENCY DEPT VISIT MOD MDM: CPT

## 2023-05-12 PROCEDURE — 96375 TX/PRO/DX INJ NEW DRUG ADDON: CPT

## 2023-05-12 PROCEDURE — 12002 RPR S/N/AX/GEN/TRNK2.6-7.5CM: CPT

## 2023-05-12 PROCEDURE — 90471 IMMUNIZATION ADMIN: CPT | Performed by: NURSE PRACTITIONER

## 2023-05-12 PROCEDURE — 2500000003 HC RX 250 WO HCPCS: Performed by: NURSE PRACTITIONER

## 2023-05-12 PROCEDURE — 73140 X-RAY EXAM OF FINGER(S): CPT

## 2023-05-12 RX ORDER — OXYCODONE HYDROCHLORIDE AND ACETAMINOPHEN 5; 325 MG/1; MG/1
1 TABLET ORAL ONCE
Status: COMPLETED | OUTPATIENT
Start: 2023-05-12 | End: 2023-05-12

## 2023-05-12 RX ORDER — NAPROXEN 500 MG/1
500 TABLET ORAL 2 TIMES DAILY PRN
Qty: 14 TABLET | Refills: 0 | Status: SHIPPED | OUTPATIENT
Start: 2023-05-12 | End: 2023-05-19

## 2023-05-12 RX ORDER — CEPHALEXIN 500 MG/1
500 CAPSULE ORAL 4 TIMES DAILY
Qty: 40 CAPSULE | Refills: 0 | Status: SHIPPED | OUTPATIENT
Start: 2023-05-12 | End: 2023-05-22

## 2023-05-12 RX ORDER — KETOROLAC TROMETHAMINE 30 MG/ML
15 INJECTION, SOLUTION INTRAMUSCULAR; INTRAVENOUS ONCE
Status: COMPLETED | OUTPATIENT
Start: 2023-05-12 | End: 2023-05-12

## 2023-05-12 RX ORDER — TETANUS AND DIPHTHERIA TOXOIDS ADSORBED 2; 2 [LF]/.5ML; [LF]/.5ML
0.5 INJECTION INTRAMUSCULAR ONCE
Status: COMPLETED | OUTPATIENT
Start: 2023-05-12 | End: 2023-05-12

## 2023-05-12 RX ORDER — GINSENG 100 MG
CAPSULE ORAL ONCE
Status: COMPLETED | OUTPATIENT
Start: 2023-05-12 | End: 2023-05-12

## 2023-05-12 RX ORDER — BACITRACIN, NEOMYCIN, POLYMYXIN B 400; 3.5; 5 [USP'U]/G; MG/G; [USP'U]/G
OINTMENT TOPICAL
Qty: 30 G | Refills: 0 | Status: SHIPPED | OUTPATIENT
Start: 2023-05-12 | End: 2023-05-22

## 2023-05-12 RX ORDER — LIDOCAINE HYDROCHLORIDE 10 MG/ML
20 INJECTION, SOLUTION INFILTRATION; PERINEURAL ONCE
Status: COMPLETED | OUTPATIENT
Start: 2023-05-12 | End: 2023-05-12

## 2023-05-12 RX ORDER — ACETAMINOPHEN 500 MG
500 TABLET ORAL 4 TIMES DAILY PRN
Qty: 20 TABLET | Refills: 1 | Status: SHIPPED | OUTPATIENT
Start: 2023-05-12

## 2023-05-12 RX ADMIN — CEFAZOLIN 2000 MG: 2 INJECTION, POWDER, FOR SOLUTION INTRAMUSCULAR; INTRAVENOUS at 19:27

## 2023-05-12 RX ADMIN — TETANUS AND DIPHTHERIA TOXOIDS ADSORBED 0.5 ML: 2; 2 INJECTION INTRAMUSCULAR at 17:32

## 2023-05-12 RX ADMIN — KETOROLAC TROMETHAMINE 15 MG: 30 INJECTION, SOLUTION INTRAMUSCULAR; INTRAVENOUS at 19:27

## 2023-05-12 RX ADMIN — OXYCODONE HYDROCHLORIDE AND ACETAMINOPHEN 1 TABLET: 5; 325 TABLET ORAL at 17:40

## 2023-05-12 RX ADMIN — LIDOCAINE HYDROCHLORIDE 20 ML: 10 INJECTION, SOLUTION INFILTRATION; PERINEURAL at 17:40

## 2023-05-12 RX ADMIN — Medication: at 17:41

## 2023-05-12 ASSESSMENT — PAIN SCALES - GENERAL
PAINLEVEL_OUTOF10: 6
PAINLEVEL_OUTOF10: 7

## 2023-05-12 NOTE — ED PROVIDER NOTES
results interpreted by Radiologist unless otherwise noted. XR FINGER LEFT (MIN 2 VIEWS)   Final Result   No acute osseous abnormality. Focal soft tissue swelling, distal 2nd digit. 2nd report           ED Course / Medical Decision Making     Medications   diptheria-tetanus toxoids (TDVAX) 2-2 LF/0.5ML injection 0.5 mL (0.5 mLs IntraMUSCular Given 5/12/23 1732)   oxyCODONE-acetaminophen (PERCOCET) 5-325 MG per tablet 1 tablet (1 tablet Oral Given 5/12/23 1740)   lidocaine 1 % injection 20 mL (20 mLs IntraDERmal Given 5/12/23 1740)   bacitracin ointment ( Topical Given 5/12/23 1741)   ceFAZolin (ANCEF) 2,000 mg in sterile water 20 mL IV syringe (2,000 mg IntraVENous Given 5/12/23 1927)   ketorolac (TORADOL) injection 15 mg (15 mg IntraVENous Given 5/12/23 1927)        Consult(s):   None    Procedure(s):   PROCEDURE NOTE  5/12/23       Time: 4284    LACERATION REPAIR  Risks, benefits and alternatives (for applicable procedures below) described. Performed By: NVAYA Braun CNP. Informed consent: Verbal consent obtained. The patient was counseled regarding the procedure in person, it's indications, risks, potential complications and alternatives and any questions were answered. Verbal consent was obtained. Laceration #: 1. Location: Left index finger  Length: Irregular shaped laceration approximately 6 cm. Total  The wound area was irrigated copiously with sterile saline and draped in a sterile fashion. Local Anesthesia:  obtained with Lidocaine 1% without epinephrine. The wound was explored with the following results: Thickness: full thickness. no foreign body or tendon injury seen. Debridement: partial thickness of loose skin proximal to the PIP joint  Undermining: None. Wound Margins Revised: None. Flaps Aligned: yes. The wound was closed in single layer closure with #10  4-0 Prolene # 3 3-0 Prolene sutures using interrupted suture(s).   Dressing:  bacitracin, a sterile dressing, a

## 2023-05-12 NOTE — DISCHARGE INSTRUCTIONS
Elevate hand above the level of heart as much as possible and avoid smoking and return if any signs of infections, fever, chills pain out of proportion.

## 2023-05-12 NOTE — ED NOTES
Patient noted stable and presenting in no acute distress. Discharge instructions and medication list reviewed (as required) with the patient. All questions and concerns were addressed at this time, and the patient expresses understanding. Patient released with vitals noted as recorded.         Juana Patterson RN  05/12/23 6726

## 2023-09-15 ENCOUNTER — APPOINTMENT (OUTPATIENT)
Dept: CT IMAGING | Age: 53
End: 2023-09-15
Payer: COMMERCIAL

## 2023-09-15 ENCOUNTER — APPOINTMENT (OUTPATIENT)
Dept: GENERAL RADIOLOGY | Age: 53
End: 2023-09-15
Payer: COMMERCIAL

## 2023-09-15 ENCOUNTER — HOSPITAL ENCOUNTER (OUTPATIENT)
Age: 53
Setting detail: OBSERVATION
Discharge: HOME | End: 2023-09-16
Attending: EMERGENCY MEDICINE | Admitting: INTERNAL MEDICINE
Payer: COMMERCIAL
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
## 2023-09-15 PROBLEM — R55 SYNCOPE AND COLLAPSE: Status: ACTIVE | Noted: 2023-09-15

## 2023-09-15 PROBLEM — E66.8 MODERATE OBESITY: Status: ACTIVE | Noted: 2023-09-15

## 2023-09-15 PROBLEM — J44.9 CHRONIC OBSTRUCTIVE PULMONARY DISEASE (HCC): Status: ACTIVE | Noted: 2023-09-15

## 2023-09-15 LAB
ALBUMIN SERPL-MCNC: 4.2 G/DL (ref 3.5–5.2)
ALP SERPL-CCNC: 75 U/L (ref 40–129)
ALT SERPL-CCNC: 18 U/L (ref 0–40)
AMPHET UR QL SCN: NEGATIVE
ANION GAP SERPL CALCULATED.3IONS-SCNC: 10 MMOL/L (ref 7–16)
AST SERPL-CCNC: 18 U/L (ref 0–39)
BACTERIA URNS QL MICRO: ABNORMAL
BARBITURATES UR QL SCN: NEGATIVE
BASOPHILS # BLD: 0.09 K/UL (ref 0–0.2)
BASOPHILS NFR BLD: 1 % (ref 0–2)
BENZODIAZ UR QL: NEGATIVE
BILIRUB SERPL-MCNC: 0.2 MG/DL (ref 0–1.2)
BILIRUB UR QL STRIP: NEGATIVE
BNP SERPL-MCNC: 83 PG/ML (ref 0–125)
BUN SERPL-MCNC: 21 MG/DL (ref 6–20)
BUPRENORPHINE UR QL: NEGATIVE
CALCIUM SERPL-MCNC: 9.7 MG/DL (ref 8.6–10.2)
CANNABINOIDS UR QL SCN: POSITIVE
CHLORIDE SERPL-SCNC: 106 MMOL/L (ref 98–107)
CLARITY UR: CLEAR
CO2 SERPL-SCNC: 23 MMOL/L (ref 22–29)
COCAINE UR QL SCN: NEGATIVE
COLOR UR: YELLOW
CREAT SERPL-MCNC: 1.1 MG/DL (ref 0.7–1.2)
EOSINOPHIL # BLD: 0.03 K/UL (ref 0.05–0.5)
EOSINOPHILS RELATIVE PERCENT: 0 % (ref 0–6)
ERYTHROCYTE [DISTWIDTH] IN BLOOD BY AUTOMATED COUNT: 13.2 % (ref 11.5–15)
FENTANYL UR QL: NEGATIVE
GFR SERPL CREATININE-BSD FRML MDRD: >60 ML/MIN/1.73M2
GLUCOSE SERPL-MCNC: 104 MG/DL (ref 74–99)
GLUCOSE UR STRIP-MCNC: NEGATIVE MG/DL
HCT VFR BLD AUTO: 44.9 % (ref 37–54)
HGB BLD-MCNC: 15.1 G/DL (ref 12.5–16.5)
HGB UR QL STRIP.AUTO: NEGATIVE
IMM GRANULOCYTES # BLD AUTO: 0.06 K/UL (ref 0–0.58)
IMM GRANULOCYTES NFR BLD: 1 % (ref 0–5)
KETONES UR STRIP-MCNC: NEGATIVE MG/DL
LEUKOCYTE ESTERASE UR QL STRIP: ABNORMAL
LYMPHOCYTES NFR BLD: 1.57 K/UL (ref 1.5–4)
LYMPHOCYTES RELATIVE PERCENT: 14 % (ref 20–42)
MCH RBC QN AUTO: 32.4 PG (ref 26–35)
MCHC RBC AUTO-ENTMCNC: 33.6 G/DL (ref 32–34.5)
MCV RBC AUTO: 96.4 FL (ref 80–99.9)
METHADONE UR QL: NEGATIVE
MONOCYTES NFR BLD: 0.64 K/UL (ref 0.1–0.95)
MONOCYTES NFR BLD: 6 % (ref 2–12)
NEUTROPHILS NFR BLD: 79 % (ref 43–80)
NEUTS SEG NFR BLD: 9.18 K/UL (ref 1.8–7.3)
NITRITE UR QL STRIP: NEGATIVE
OPIATES UR QL SCN: NEGATIVE
OXYCODONE UR QL SCN: NEGATIVE
PCP UR QL SCN: NEGATIVE
PH UR STRIP: 6.5 [PH] (ref 5–9)
PLATELET # BLD AUTO: 228 K/UL (ref 130–450)
PMV BLD AUTO: 10.6 FL (ref 7–12)
POTASSIUM SERPL-SCNC: 4.1 MMOL/L (ref 3.5–5)
PROT SERPL-MCNC: 7.1 G/DL (ref 6.4–8.3)
PROT UR STRIP-MCNC: NEGATIVE MG/DL
RBC # BLD AUTO: 4.66 M/UL (ref 3.8–5.8)
RBC #/AREA URNS HPF: ABNORMAL /HPF
SARS-COV-2 RDRP RESP QL NAA+PROBE: NOT DETECTED
SODIUM SERPL-SCNC: 139 MMOL/L (ref 132–146)
SP GR UR STRIP: <1.005 (ref 1–1.03)
SPECIMEN DESCRIPTION: NORMAL
TEST INFORMATION: ABNORMAL
TROPONIN I SERPL HS-MCNC: 13 NG/L (ref 0–11)
UROBILINOGEN UR STRIP-ACNC: 0.2 EU/DL (ref 0–1)
WBC #/AREA URNS HPF: ABNORMAL /HPF
WBC OTHER # BLD: 11.6 K/UL (ref 4.5–11.5)

## 2023-09-15 PROCEDURE — 99285 EMERGENCY DEPT VISIT HI MDM: CPT

## 2023-09-15 PROCEDURE — G0378 HOSPITAL OBSERVATION PER HR: HCPCS

## 2023-09-15 PROCEDURE — 87635 SARS-COV-2 COVID-19 AMP PRB: CPT

## 2023-09-15 PROCEDURE — 99223 1ST HOSP IP/OBS HIGH 75: CPT | Performed by: INTERNAL MEDICINE

## 2023-09-15 PROCEDURE — 71046 X-RAY EXAM CHEST 2 VIEWS: CPT

## 2023-09-15 PROCEDURE — 96372 THER/PROPH/DIAG INJ SC/IM: CPT

## 2023-09-15 PROCEDURE — 84484 ASSAY OF TROPONIN QUANT: CPT

## 2023-09-15 PROCEDURE — 80307 DRUG TEST PRSMV CHEM ANLYZR: CPT

## 2023-09-15 PROCEDURE — 70450 CT HEAD/BRAIN W/O DYE: CPT

## 2023-09-15 PROCEDURE — 6360000002 HC RX W HCPCS: Performed by: INTERNAL MEDICINE

## 2023-09-15 PROCEDURE — 81001 URINALYSIS AUTO W/SCOPE: CPT

## 2023-09-15 PROCEDURE — 2580000003 HC RX 258: Performed by: INTERNAL MEDICINE

## 2023-09-15 PROCEDURE — 83880 ASSAY OF NATRIURETIC PEPTIDE: CPT

## 2023-09-15 PROCEDURE — 85025 COMPLETE CBC W/AUTO DIFF WBC: CPT

## 2023-09-15 PROCEDURE — 80053 COMPREHEN METABOLIC PANEL: CPT

## 2023-09-15 PROCEDURE — APPSS60 APP SPLIT SHARED TIME 46-60 MINUTES: Performed by: NURSE PRACTITIONER

## 2023-09-15 PROCEDURE — 72125 CT NECK SPINE W/O DYE: CPT

## 2023-09-15 PROCEDURE — 93005 ELECTROCARDIOGRAM TRACING: CPT

## 2023-09-15 RX ORDER — SENNOSIDES A AND B 8.6 MG/1
1 TABLET, FILM COATED ORAL DAILY PRN
Status: DISCONTINUED | OUTPATIENT
Start: 2023-09-15 | End: 2023-09-16 | Stop reason: HOSPADM

## 2023-09-15 RX ORDER — SODIUM CHLORIDE 0.9 % (FLUSH) 0.9 %
10 SYRINGE (ML) INJECTION EVERY 12 HOURS SCHEDULED
Status: DISCONTINUED | OUTPATIENT
Start: 2023-09-15 | End: 2023-09-16 | Stop reason: HOSPADM

## 2023-09-15 RX ORDER — ACETAMINOPHEN 650 MG/1
650 SUPPOSITORY RECTAL EVERY 6 HOURS PRN
Status: DISCONTINUED | OUTPATIENT
Start: 2023-09-15 | End: 2023-09-16 | Stop reason: HOSPADM

## 2023-09-15 RX ORDER — POTASSIUM CHLORIDE 7.45 MG/ML
10 INJECTION INTRAVENOUS PRN
Status: DISCONTINUED | OUTPATIENT
Start: 2023-09-15 | End: 2023-09-16 | Stop reason: HOSPADM

## 2023-09-15 RX ORDER — SODIUM CHLORIDE 9 MG/ML
INJECTION, SOLUTION INTRAVENOUS PRN
Status: DISCONTINUED | OUTPATIENT
Start: 2023-09-15 | End: 2023-09-16 | Stop reason: HOSPADM

## 2023-09-15 RX ORDER — ONDANSETRON 2 MG/ML
4 INJECTION INTRAMUSCULAR; INTRAVENOUS EVERY 6 HOURS PRN
Status: DISCONTINUED | OUTPATIENT
Start: 2023-09-15 | End: 2023-09-16 | Stop reason: HOSPADM

## 2023-09-15 RX ORDER — ACETAMINOPHEN 325 MG/1
650 TABLET ORAL EVERY 6 HOURS PRN
Status: DISCONTINUED | OUTPATIENT
Start: 2023-09-15 | End: 2023-09-16 | Stop reason: HOSPADM

## 2023-09-15 RX ORDER — SODIUM CHLORIDE 0.9 % (FLUSH) 0.9 %
10 SYRINGE (ML) INJECTION PRN
Status: DISCONTINUED | OUTPATIENT
Start: 2023-09-15 | End: 2023-09-16 | Stop reason: HOSPADM

## 2023-09-15 RX ORDER — ACETAMINOPHEN 500 MG
1000 TABLET ORAL 4 TIMES DAILY PRN
COMMUNITY

## 2023-09-15 RX ORDER — POTASSIUM CHLORIDE 20 MEQ/1
40 TABLET, EXTENDED RELEASE ORAL PRN
Status: DISCONTINUED | OUTPATIENT
Start: 2023-09-15 | End: 2023-09-16 | Stop reason: HOSPADM

## 2023-09-15 RX ORDER — ONDANSETRON 4 MG/1
4 TABLET, ORALLY DISINTEGRATING ORAL EVERY 8 HOURS PRN
Status: DISCONTINUED | OUTPATIENT
Start: 2023-09-15 | End: 2023-09-16 | Stop reason: HOSPADM

## 2023-09-15 RX ORDER — ENOXAPARIN SODIUM 100 MG/ML
40 INJECTION SUBCUTANEOUS DAILY
Status: DISCONTINUED | OUTPATIENT
Start: 2023-09-15 | End: 2023-09-16 | Stop reason: HOSPADM

## 2023-09-15 RX ORDER — METOPROLOL SUCCINATE 25 MG/1
25 TABLET, EXTENDED RELEASE ORAL EVERY MORNING
Status: ON HOLD | COMMUNITY
End: 2023-09-16 | Stop reason: HOSPADM

## 2023-09-15 RX ORDER — BISACODYL 10 MG
10 SUPPOSITORY, RECTAL RECTAL DAILY PRN
Status: DISCONTINUED | OUTPATIENT
Start: 2023-09-15 | End: 2023-09-16 | Stop reason: HOSPADM

## 2023-09-15 RX ADMIN — ENOXAPARIN SODIUM 40 MG: 100 INJECTION SUBCUTANEOUS at 20:02

## 2023-09-15 RX ADMIN — SODIUM CHLORIDE, PRESERVATIVE FREE 10 ML: 5 INJECTION INTRAVENOUS at 20:02

## 2023-09-15 ASSESSMENT — PAIN SCALES - GENERAL
PAINLEVEL_OUTOF10: 0
PAINLEVEL_OUTOF10: 0
PAINLEVEL_OUTOF10: 5

## 2023-09-15 ASSESSMENT — PAIN DESCRIPTION - LOCATION: LOCATION: HEAD

## 2023-09-15 ASSESSMENT — PAIN DESCRIPTION - DESCRIPTORS: DESCRIPTORS: ACHING

## 2023-09-15 ASSESSMENT — PAIN - FUNCTIONAL ASSESSMENT: PAIN_FUNCTIONAL_ASSESSMENT: 0-10

## 2023-09-15 ASSESSMENT — PAIN DESCRIPTION - FREQUENCY: FREQUENCY: CONTINUOUS

## 2023-09-15 ASSESSMENT — PAIN DESCRIPTION - PAIN TYPE: TYPE: ACUTE PAIN
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
## 2023-09-15 NOTE — PROGRESS NOTES
4 Eyes Skin Assessment     NAME:  Willie Galo  YOB: 1970  MEDICAL RECORD NUMBER:  [id number]    The patient is being assessed for  Admission    I agree that at least one RN has performed a thorough Head to Toe Skin Assessment on the patient. ALL assessment sites listed below have been assessed. Areas assessed by both nurses:    Head, Face, Ears, Shoulders, Back, Chest, Arms, Elbows, Hands, Sacrum. Buttock, Coccyx, Ischium, and Legs. Feet and Heels        Does the Patient have a Wound?  No noted wound(s)       Federico Prevention initiated by RN: No  Wound Care Orders initiated by RN: No    Pressure Injury (Stage 3,4, Unstageable, DTI, NWPT, and Complex wounds) if present, place Wound referral order by RN under : No    New Ostomies, if present place, Ostomy referral order under : No     Nurse 1 eSignature: Electronically signed by Haydee Tran RN on 9/15/23 at 5:18 PM EDT    **SHARE this note so that the co-signing nurse can place an eSignature**    Nurse 2 eSignature: Electronically signed by Anitha Henning RN on 9/15/23 at 6:53 PM EDT

## 2023-09-15 NOTE — CONSULTS
Inpatient Cardiology Consultation      Reason for Consult:  Syncope    Consulting Physician: Dr. Janell Bernstein    Requesting Physician:  Dr. Erica Carrasco    Date of Consultation: 9/15/2023    HISTORY OF PRESENT ILLNESS:   Mr. Juanis Gutierrez is a 50year old male who follows with Dr. Delores Knight since 2022. He was initially evaluated in hospital consultation on 2022 for \"hypertensive emergency/heart block. \"  Patient was noted to have episodes of a 2-1 AV block noted during hospitalization 2022 (primarily while sleeping). Patient was again admitted 2022 for a syncopal episode (reported dehydration at that time) and hydrochlorothiazide was stopped and Norvasc dose was decreased to 5 mg daily. Patient was advised to avoid AV rohith blocking agents and undergo an outpatient sleep study. Patient reported drinking heavily at that time. He was last seen in outpatient on 2022 was continued on same medications. Patient stated that he has been feeling more anxious since his son  of a drug overdose in 2023. He works in construction and has been having multiple episodes of mid-sternal chest pressure described as \"It feels like an elephant is sitting on my chest\" occurring at rest and with exertion, lasting a few minutes and up to several hours, spontaneously resolving on its own. He has not attempted to take anything for the pressure. He denies associated symptoms. He reports having increased headaches and contributes it to his construction hat. He reports having difficulty sleeping and started taking THC gummies at night but stopped 3 weeks ago because he had a syncopal event at home. His wife stated that they were sitting on the couch and he had been leaning slightly forward which was abnormal. They decided to go to bed and as they went to stand he passed out and fell to the floor. He came back around ~ 20 seconds but was initially confused and had no recollection of the event.  He refused to seek medical attention at abdominal examination no peripheral edema identified. Diagnostic Assessment and Plan: On a clinical basis, the patient presents with additional recurrent syncopal episodes with the description especially in light of transient premonitory symptoms most consistent with that of a potential arrhythmia related event. On this basis in view of his prior conduction system concerns, albeit all nocturnally noted his beta-blocker will be discontinued with ongoing arrhythmia monitoring during his hospital observation and needs of additional arrhythmia monitoring potentially inclusive of consideration of an implantable loop recorder if no arrhythmias are noted during the course of hospitalization. At the time of evaluation, this has been extensively discussed with both he and his wife. In addition, appropriate lifestyle modification will remain advisable inclusive of weight reduction to benefit diastolic cardiac performance as well as reducing risk of the development of obstructive sleep apnea with associated adverse cardiovascular effects and needs of a formal sleep assessment initiation of nocturnal CPAP as appropriate. In addition smoking cessation will be necessary to reduce risk of further adverse effects in the face of chronic obstructive lung disease as well as that of his atherosclerotic risk. Ongoing aggressive risk factor modification of blood pressure and serum lipids will remain essential to reducing risk of future atherosclerotic development. I have participated in a substantive portion of the present encounter inclusive of a component of independent history and examination in addition to that of medical decision making regarding patient care. Thank you for allowing me to participate in your patient's care. Please feel free to contact me if you have any questions or concerns. Devendra Mendoza.  Jenniffer Ritter, [address] [facility]

## 2023-09-16 VITALS
BODY MASS INDEX: 30.85 KG/M2 | SYSTOLIC BLOOD PRESSURE: 132 MMHG | DIASTOLIC BLOOD PRESSURE: 76 MMHG | WEIGHT: 220.4 LBS | RESPIRATION RATE: 16 BRPM | TEMPERATURE: 98.6 F | OXYGEN SATURATION: 97 % | HEART RATE: 52 BPM | HEIGHT: 71 IN

## 2023-09-16 LAB
ALBUMIN SERPL-MCNC: 4.1 G/DL (ref 3.5–5.2)
ALP SERPL-CCNC: 74 U/L (ref 40–129)
ALT SERPL-CCNC: 19 U/L (ref 0–40)
ANION GAP SERPL CALCULATED.3IONS-SCNC: 10 MMOL/L (ref 7–16)
AST SERPL-CCNC: 18 U/L (ref 0–39)
BASOPHILS # BLD: 0.11 K/UL (ref 0–0.2)
BASOPHILS NFR BLD: 1 % (ref 0–2)
BILIRUB SERPL-MCNC: 0.2 MG/DL (ref 0–1.2)
BUN SERPL-MCNC: 26 MG/DL (ref 6–20)
CALCIUM SERPL-MCNC: 9.4 MG/DL (ref 8.6–10.2)
CHLORIDE SERPL-SCNC: 107 MMOL/L (ref 98–107)
CO2 SERPL-SCNC: 24 MMOL/L (ref 22–29)
CREAT SERPL-MCNC: 1 MG/DL (ref 0.7–1.2)
EKG ATRIAL RATE: 58 BPM
EKG P AXIS: -4 DEGREES
EKG P-R INTERVAL: 170 MS
EKG Q-T INTERVAL: 454 MS
EKG QRS DURATION: 150 MS
EKG QTC CALCULATION (BAZETT): 445 MS
EKG R AXIS: 130 DEGREES
EKG T AXIS: 8 DEGREES
EKG VENTRICULAR RATE: 58 BPM
EOSINOPHIL # BLD: 0.22 K/UL (ref 0.05–0.5)
EOSINOPHILS RELATIVE PERCENT: 2 % (ref 0–6)
ERYTHROCYTE [DISTWIDTH] IN BLOOD BY AUTOMATED COUNT: 13.6 % (ref 11.5–15)
GFR SERPL CREATININE-BSD FRML MDRD: >60 ML/MIN/1.73M2
GLUCOSE SERPL-MCNC: 89 MG/DL (ref 74–99)
HCT VFR BLD AUTO: 43.8 % (ref 37–54)
HGB BLD-MCNC: 14.5 G/DL (ref 12.5–16.5)
IMM GRANULOCYTES # BLD AUTO: 0.05 K/UL (ref 0–0.58)
IMM GRANULOCYTES NFR BLD: 1 % (ref 0–5)
LYMPHOCYTES NFR BLD: 1.96 K/UL (ref 1.5–4)
LYMPHOCYTES RELATIVE PERCENT: 19 % (ref 20–42)
MCH RBC QN AUTO: 32.1 PG (ref 26–35)
MCHC RBC AUTO-ENTMCNC: 33.1 G/DL (ref 32–34.5)
MCV RBC AUTO: 96.9 FL (ref 80–99.9)
MONOCYTES NFR BLD: 0.78 K/UL (ref 0.1–0.95)
MONOCYTES NFR BLD: 8 % (ref 2–12)
NEUTROPHILS NFR BLD: 70 % (ref 43–80)
NEUTS SEG NFR BLD: 7.32 K/UL (ref 1.8–7.3)
PLATELET # BLD AUTO: 204 K/UL (ref 130–450)
PMV BLD AUTO: 10.4 FL (ref 7–12)
POTASSIUM SERPL-SCNC: 4.2 MMOL/L (ref 3.5–5)
PROT SERPL-MCNC: 6.8 G/DL (ref 6.4–8.3)
RBC # BLD AUTO: 4.52 M/UL (ref 3.8–5.8)
SODIUM SERPL-SCNC: 141 MMOL/L (ref 132–146)
TSH SERPL DL<=0.05 MIU/L-ACNC: 0.76 UIU/ML (ref 0.27–4.2)
WBC OTHER # BLD: 10.4 K/UL (ref 4.5–11.5)

## 2023-09-16 PROCEDURE — 6370000000 HC RX 637 (ALT 250 FOR IP): Performed by: INTERNAL MEDICINE

## 2023-09-16 PROCEDURE — 2580000003 HC RX 258: Performed by: INTERNAL MEDICINE

## 2023-09-16 PROCEDURE — 84443 ASSAY THYROID STIM HORMONE: CPT

## 2023-09-16 PROCEDURE — 80053 COMPREHEN METABOLIC PANEL: CPT

## 2023-09-16 PROCEDURE — 85025 COMPLETE CBC W/AUTO DIFF WBC: CPT

## 2023-09-16 PROCEDURE — G0378 HOSPITAL OBSERVATION PER HR: HCPCS

## 2023-09-16 PROCEDURE — 36415 COLL VENOUS BLD VENIPUNCTURE: CPT

## 2023-09-16 PROCEDURE — 93010 ELECTROCARDIOGRAM REPORT: CPT | Performed by: INTERNAL MEDICINE

## 2023-09-16 PROCEDURE — 99233 SBSQ HOSP IP/OBS HIGH 50: CPT | Performed by: INTERNAL MEDICINE

## 2023-09-16 RX ORDER — AMLODIPINE BESYLATE 5 MG/1
5 TABLET ORAL EVERY MORNING
Status: DISCONTINUED | OUTPATIENT
Start: 2023-09-16 | End: 2023-09-16 | Stop reason: HOSPADM

## 2023-09-16 RX ORDER — CLONIDINE HYDROCHLORIDE 0.1 MG/1
0.1 TABLET ORAL 2 TIMES DAILY
Status: DISCONTINUED | OUTPATIENT
Start: 2023-09-16 | End: 2023-09-16

## 2023-09-16 RX ORDER — HYDRALAZINE HYDROCHLORIDE 50 MG/1
100 TABLET, FILM COATED ORAL 3 TIMES DAILY
Status: DISCONTINUED | OUTPATIENT
Start: 2023-09-16 | End: 2023-09-16 | Stop reason: HOSPADM

## 2023-09-16 RX ORDER — ATORVASTATIN CALCIUM 40 MG/1
40 TABLET, FILM COATED ORAL NIGHTLY
Status: DISCONTINUED | OUTPATIENT
Start: 2023-09-16 | End: 2023-09-16 | Stop reason: HOSPADM

## 2023-09-16 RX ORDER — LISINOPRIL 20 MG/1
20 TABLET ORAL EVERY MORNING
Status: DISCONTINUED | OUTPATIENT
Start: 2023-09-16 | End: 2023-09-16 | Stop reason: HOSPADM

## 2023-09-16 RX ADMIN — SODIUM CHLORIDE, PRESERVATIVE FREE 10 ML: 5 INJECTION INTRAVENOUS at 09:46

## 2023-09-16 RX ADMIN — HYDRALAZINE HYDROCHLORIDE 100 MG: 50 TABLET, FILM COATED ORAL at 09:45

## 2023-09-16 RX ADMIN — LISINOPRIL 20 MG: 20 TABLET ORAL at 09:45

## 2023-09-16 RX ADMIN — AMLODIPINE BESYLATE 5 MG: 5 TABLET ORAL at 09:45

## 2023-09-16 NOTE — PLAN OF CARE
Problem: Discharge Planning  Goal: Discharge to home or other facility with appropriate resources  9/16/2023 1045 by Lawrence Curran RN  Outcome: Completed     Problem: Pain  Goal: Verbalizes/displays adequate comfort level or baseline comfort level  9/16/2023 1045 by Lawrence Curran RN  Outcome: Completed     Problem: Safety - Adult  Goal: Free from fall injury  9/16/2023 1045 by Lawrence Curran RN  Outcome: Completed     Problem: ABCDS Injury Assessment  Goal: Absence of physical injury  9/16/2023 1045 by Lawrence Curran RN  Outcome: Completed
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
## 2023-09-18 ENCOUNTER — TELEPHONE (OUTPATIENT)
Dept: CARDIOLOGY CLINIC | Age: 53
End: 2023-09-18

## 2023-09-18 ENCOUNTER — NURSE ONLY (OUTPATIENT)
Dept: CARDIOLOGY CLINIC | Age: 53
End: 2023-09-18

## 2023-09-18 DIAGNOSIS — R55 RECURRENT SYNCOPE: Primary | ICD-10-CM

## 2023-09-18 NOTE — PROGRESS NOTES
Patient was seen today and a 14 day Zio AT  monitor was placed. Monitor was ordered by Dr. Cee Johnson. The monitor was applied. Instructions were given to the patient. Patient stated understanding and gave verbalize feed back.      [facility]     Serial number [id number]                Ginna Ontiveros MA
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
## 2023-09-19 ENCOUNTER — TELEPHONE (OUTPATIENT)
Dept: CARDIOLOGY CLINIC | Age: 53
End: 2023-09-19

## 2023-09-20 ENCOUNTER — TELEPHONE (OUTPATIENT)
Dept: CARDIOLOGY CLINIC | Age: 53
End: 2023-09-20

## 2023-10-02 ENCOUNTER — TELEPHONE (OUTPATIENT)
Dept: CARDIOLOGY CLINIC | Age: 53
End: 2023-10-02

## 2023-10-02 NOTE — TELEPHONE ENCOUNTER
[employer] called with an abnormal Zio AT monitor report (scanned for review), please advise

## 2023-10-09 ENCOUNTER — TELEPHONE (OUTPATIENT)
Dept: CARDIOLOGY CLINIC | Age: 53
End: 2023-10-09

## 2023-10-09 NOTE — TELEPHONE ENCOUNTER
Patient is asking if he is cleared to return to work without restrictions starting on 10/16, please advise

## 2023-10-11 DIAGNOSIS — R55 RECURRENT SYNCOPE: ICD-10-CM

## 2023-12-11 ENCOUNTER — HOSPITAL ENCOUNTER (OUTPATIENT)
Dept: NEUROLOGY | Age: 53
Discharge: HOME | End: 2023-12-11
Payer: COMMERCIAL

## 2023-12-11 ENCOUNTER — OFFICE VISIT (OUTPATIENT)
Dept: CARDIOLOGY CLINIC | Age: 53
End: 2023-12-11

## 2023-12-11 VITALS
BODY MASS INDEX: 33.95 KG/M2 | RESPIRATION RATE: 16 BRPM | HEIGHT: 71 IN | HEART RATE: 63 BPM | WEIGHT: 242.5 LBS | SYSTOLIC BLOOD PRESSURE: 130 MMHG | DIASTOLIC BLOOD PRESSURE: 60 MMHG

## 2023-12-11 DIAGNOSIS — I10 PRIMARY HYPERTENSION: ICD-10-CM

## 2023-12-11 DIAGNOSIS — I45.10 RBBB: ICD-10-CM

## 2023-12-11 DIAGNOSIS — Z72.0 TOBACCO ABUSE: ICD-10-CM

## 2023-12-11 DIAGNOSIS — R55 SYNCOPE AND COLLAPSE: Primary | ICD-10-CM

## 2023-12-11 DIAGNOSIS — G47.33 OSA (OBSTRUCTIVE SLEEP APNEA): ICD-10-CM

## 2023-12-11 DIAGNOSIS — I44.30 AV BLOCK: ICD-10-CM

## 2023-12-11 PROCEDURE — 95816 EEG AWAKE AND DROWSY: CPT | Performed by: PSYCHIATRY & NEUROLOGY

## 2023-12-11 PROCEDURE — 95816 EEG AWAKE AND DROWSY: CPT

## 2023-12-11 NOTE — PROCEDURES
[address] Report    MRN: [id number]  PATIENT NAME: Sadiq Otero  DATE OF REPORT: 2023   DATE OF SERVICE: 2023  PHYSICIAN NAME: General Meigs, DO  Referring Physician: Edward Talbert MD      Patient's : 1970  Patient's Age: 48 y.o. Gender: male    PROCEDURE: Routine EEG with video      Clinical Interpretation: This was a normal study during waking and drowsiness. No seizures or epileptiform discharges were noted during this study. ____________________________  Electronically signed by: General Meigs, DO, 2023 9:30 AM      Patient Clinical Information   Reason for Study: Patient undergoing evaluation for seizures  Patient State: Awake  Primary neurological diagnosis: Spells of uncertain etiology  Primary indication for monitoring: Characterization of spells    Pertinent Medications and Treatments  None      Sedatives administered: No  Intubated: No  Pharmacological paralytic: No    Reporting Period  Start of Study: 76, 2023   End of Study:  2023       EEG Description  Digital video and scalp EEG monitoring was performed using the standard protocol for this laboratory. Scalp electrodes were applied in the international 10/20 system. Multiple digital montage arrangements were utilized for evaluation. EKG and video were recorded. Background:      Occipital rhythm (posterior dominant rhythm or PDR): Present  Frequency: 11 Hz  Voltage: Medium  Organization: good   Reactivity to eye opening/closure: good     Drowsiness: Present - normal  Sleep: Absent    Technical and Activation Procedures:  Hyperventilation: Not done  Photic stimulation: Done - physiologic driving noted  Reactivity to stimulation: Yes    Abnormalities:    I. Seizures? No    II. Rhythmic or Periodic Patterns? No    III. Other Abnormalities?   No

## 2023-12-11 NOTE — PROGRESS NOTES
Results   Component Value Date    CREATININE 1.0 09/16/2023    BUN 26 (H) 09/16/2023     09/16/2023    K 4.2 09/16/2023     09/16/2023    CO2 24 09/16/2023     Lab Results   Component Value Date/Time    MG 2.3 01/19/2022 05:05 AM     Lab Results   Component Value Date    WBC 10.4 09/16/2023    HGB 14.5 09/16/2023    HCT 43.8 09/16/2023    MCV 96.9 09/16/2023     09/16/2023     Lab Results   Component Value Date    ALT 19 09/16/2023    AST 18 09/16/2023    ALKPHOS 74 09/16/2023    BILITOT 0.2 09/16/2023     No results found for: \"CKTOTAL\", \"CKMB\", \"CKMBINDEX\", \"TROPONINI\"  No results for input(s): \"CKTOTAL\", \"CKMB\", \"CKMBINDEX\", \"TROPHS\" in the last 72 hours. No results found for: \"INR\", \"PROTIME\"  Lab Results   Component Value Date    TSH 0.76 09/16/2023     Lab Results   Component Value Date    LABA1C 5.3 01/16/2022     No results found for: \"EAG\"  Lab Results   Component Value Date    CHOL 204 (H) 01/16/2022     Lab Results   Component Value Date    TRIG 181 (H) 01/16/2022     Lab Results   Component Value Date    HDL 36 01/16/2022     Lab Results   Component Value Date    LDLCALC 132 (H) 01/16/2022     Lab Results   Component Value Date    LABVLDL 36 01/16/2022     No results found for: \"CHOLHDLRATIO\"  No results for input(s): \"PROBNP\" in the last 72 hours. Cardiac Tests:  EKG reviewed (12/15/23): SR, rate 63, RBBB    EKG reviewed (6/28/22): SR, rate 79, RBBB    EKG reviewed (2/22/22): SR, rate 92, RBBB    EKG reviewed (1/2022): SB, rate 38, 2:1 AV block, RBBB     EKG reviewed (1/2022): SB, rate 57, RBBB     Telemetry reviewed (date: 1/16/2022): SR, rate 60's, episodes of 2:1 AV block overnight while sleeping     Echocardiogram reviewed: 1/16/22   Normal left ventricular systolic function. Ejection fraction is visually estimated at 60%. Moderate concentric left ventricular hypertrophy. Dilated right ventricle and normal right ventricular function (TAPSE 2.9 cm).    There is doppler

## (undated) DEVICE — FORCEPS BX OVL CUP FEN DISPOSABLE CAP L 160CM RAD JAW 4

## (undated) DEVICE — SPONGE GZ W4XL4IN RAYON POLY FILL CVR W/ NONWOVEN FAB

## (undated) DEVICE — GRADUATE TRIANG MEASURE 1000ML BLK PRNT

## (undated) DEVICE — BLOCK BITE 60FR RUBBER ADLT DENTAL